# Patient Record
Sex: FEMALE | Race: WHITE | Employment: OTHER | URBAN - METROPOLITAN AREA
[De-identification: names, ages, dates, MRNs, and addresses within clinical notes are randomized per-mention and may not be internally consistent; named-entity substitution may affect disease eponyms.]

---

## 2017-06-08 ENCOUNTER — HOSPITAL ENCOUNTER (OUTPATIENT)
Dept: SURGERY | Age: 82
Discharge: HOME | End: 2017-06-08
Payer: MEDICARE

## 2017-06-08 VITALS
WEIGHT: 120.13 LBS | BODY MASS INDEX: 23.58 KG/M2 | HEIGHT: 60 IN | SYSTOLIC BLOOD PRESSURE: 145 MMHG | OXYGEN SATURATION: 99 % | TEMPERATURE: 97.8 F | DIASTOLIC BLOOD PRESSURE: 70 MMHG | RESPIRATION RATE: 16 BRPM | HEART RATE: 58 BPM

## 2017-06-08 LAB
HGB BLD-MCNC: 14.1 G/DL (ref 11.7–15.4)
POTASSIUM SERPL-SCNC: 3.7 MMOL/L (ref 3.5–5.1)

## 2017-06-08 PROCEDURE — 84132 ASSAY OF SERUM POTASSIUM: CPT | Performed by: ANESTHESIOLOGY

## 2017-06-08 PROCEDURE — 85018 HEMOGLOBIN: CPT | Performed by: ANESTHESIOLOGY

## 2017-06-08 RX ORDER — GABAPENTIN 300 MG/1
300 CAPSULE ORAL 3 TIMES DAILY
COMMUNITY

## 2017-06-08 RX ORDER — ATENOLOL 50 MG/1
50 TABLET ORAL DAILY
COMMUNITY

## 2017-06-08 RX ORDER — DILTIAZEM HYDROCHLORIDE 60 MG/1
60 TABLET, FILM COATED ORAL DAILY
COMMUNITY

## 2017-06-08 RX ORDER — RABEPRAZOLE SODIUM 20 MG/1
20 TABLET, DELAYED RELEASE ORAL
COMMUNITY

## 2017-06-08 RX ORDER — TRIAMTERENE AND HYDROCHLOROTHIAZIDE 37.5; 25 MG/1; MG/1
1 CAPSULE ORAL
COMMUNITY

## 2017-06-08 RX ORDER — SIMVASTATIN 20 MG/1
20 TABLET, FILM COATED ORAL
COMMUNITY

## 2017-06-08 NOTE — PERIOP NOTES
Patient verified name, , and surgery as listed in [facility]. TYPE  CASE:lB  Orders per surgeon: were Received  Labs per surgeon:none:  Labs per anesthesia protocol: potassium and hgb collected and sent to lab, results are in [facility]  EKG  :  EKG is not required per protocol, patient denies any chest pain, or shortness of breath on exertion      Patient provided with handouts including guide to surgery , transfusions, pain management and hand hygiene for the family and community. Pt verbalizes understanding of all pre-op instructions . Instructed that family must be present in building at all times. Nothing to eat or drink after midnight the night prior to surgery. Hibiclens and instructions given per hospital policy. Instructed patient to continue  previous medications as prescribed prior to surgery and  to take the following medications the day of surgery according to anesthesia guidelines : atenolol, cardizem, and gabapentin       Original medication prescription bottles were not visualized during patient appointment. Continue all previous medications unless otherwise directed. Instructed patient to hold  the following medications prior to surgery: all vitamins,supplements 7 days prior to surgery and all nsaids including ibuprofen,advil aleve 5 days prior to surgery      Patient verbalized understanding of all instructions and provided all medical/health information to the best of their ability.

## 2017-06-14 ENCOUNTER — ANESTHESIA EVENT (OUTPATIENT)
Dept: SURGERY | Age: 82
End: 2017-06-14
Payer: MEDICARE

## 2017-06-15 ENCOUNTER — APPOINTMENT (OUTPATIENT)
Dept: GENERAL RADIOLOGY | Age: 82
End: 2017-06-15
Attending: ORTHOPAEDIC SURGERY
Payer: MEDICARE

## 2017-06-15 ENCOUNTER — HOSPITAL ENCOUNTER (OUTPATIENT)
Age: 82
Setting detail: OUTPATIENT SURGERY
Discharge: HOME | End: 2017-06-15
Attending: ORTHOPAEDIC SURGERY | Admitting: ORTHOPAEDIC SURGERY
Payer: MEDICARE

## 2017-06-15 ENCOUNTER — ANESTHESIA (OUTPATIENT)
Dept: SURGERY | Age: 82
End: 2017-06-15
Payer: MEDICARE

## 2017-06-15 VITALS
DIASTOLIC BLOOD PRESSURE: 63 MMHG | HEART RATE: 78 BPM | TEMPERATURE: 97.6 F | HEIGHT: 60 IN | BODY MASS INDEX: 22.97 KG/M2 | SYSTOLIC BLOOD PRESSURE: 122 MMHG | WEIGHT: 117 LBS | OXYGEN SATURATION: 97 % | RESPIRATION RATE: 15 BRPM

## 2017-06-15 PROCEDURE — 74011250636 HC RX REV CODE- 250/636

## 2017-06-15 PROCEDURE — 77030013789 HC KT REP BIO TEND ARTH -C: Performed by: ORTHOPAEDIC SURGERY

## 2017-06-15 PROCEDURE — 74011000250 HC RX REV CODE- 250

## 2017-06-15 PROCEDURE — 74011250636 HC RX REV CODE- 250/636: Performed by: ANESTHESIOLOGY

## 2017-06-15 PROCEDURE — 76942 ECHO GUIDE FOR BIOPSY: CPT | Performed by: ORTHOPAEDIC SURGERY

## 2017-06-15 PROCEDURE — 77030002982 HC SUT POLYSRB J&J -A: Performed by: ORTHOPAEDIC SURGERY

## 2017-06-15 PROCEDURE — 77030020143 HC AIRWY LARYN INTUB CGAS -A: Performed by: ANESTHESIOLOGY

## 2017-06-15 PROCEDURE — 76010010054 HC POST OP PAIN BLOCK: Performed by: ORTHOPAEDIC SURGERY

## 2017-06-15 PROCEDURE — 76210000021 HC REC RM PH II 0.5 TO 1 HR: Performed by: ORTHOPAEDIC SURGERY

## 2017-06-15 PROCEDURE — 77030020753 HC CUF TRNQT 1BLA STRY -B: Performed by: ORTHOPAEDIC SURGERY

## 2017-06-15 PROCEDURE — 74011250636 HC RX REV CODE- 250/636: Performed by: ORTHOPAEDIC SURGERY

## 2017-06-15 PROCEDURE — 74011250637 HC RX REV CODE- 250/637: Performed by: ANESTHESIOLOGY

## 2017-06-15 PROCEDURE — 77030002916 HC SUT ETHLN J&J -A: Performed by: ORTHOPAEDIC SURGERY

## 2017-06-15 PROCEDURE — 77030020274 HC MISC IMPL ORTHOPEDIC: Performed by: ORTHOPAEDIC SURGERY

## 2017-06-15 PROCEDURE — 77030011640 HC PAD GRND REM COVD -A: Performed by: ORTHOPAEDIC SURGERY

## 2017-06-15 PROCEDURE — 76010000161 HC OR TIME 1 TO 1.5 HR INTENSV-TIER 1: Performed by: ORTHOPAEDIC SURGERY

## 2017-06-15 PROCEDURE — 77030003602 HC NDL NRV BLK BBMI -B: Performed by: ANESTHESIOLOGY

## 2017-06-15 PROCEDURE — C1713 ANCHOR/SCREW BN/BN,TIS/BN: HCPCS | Performed by: ORTHOPAEDIC SURGERY

## 2017-06-15 PROCEDURE — 77030006788 HC BLD SAW OSC STRY -B: Performed by: ORTHOPAEDIC SURGERY

## 2017-06-15 PROCEDURE — 76210000006 HC OR PH I REC 0.5 TO 1 HR: Performed by: ORTHOPAEDIC SURGERY

## 2017-06-15 PROCEDURE — 77030018836 HC SOL IRR NACL ICUM -A: Performed by: ORTHOPAEDIC SURGERY

## 2017-06-15 PROCEDURE — 76060000033 HC ANESTHESIA 1 TO 1.5 HR: Performed by: ORTHOPAEDIC SURGERY

## 2017-06-15 PROCEDURE — 77030025281 HC SPLNT ORTHGLS 1 BSNM -B: Performed by: ORTHOPAEDIC SURGERY

## 2017-06-15 DEVICE — SCREW INTFR L10MM DIA4MM BIOCOMPOSITE BIO-TENODESIS: Type: IMPLANTABLE DEVICE | Site: FOOT | Status: FUNCTIONAL

## 2017-06-15 RX ORDER — PROPOFOL 10 MG/ML
INJECTION, EMULSION INTRAVENOUS AS NEEDED
Status: DISCONTINUED | OUTPATIENT
Start: 2017-06-15 | End: 2017-06-15 | Stop reason: HOSPADM

## 2017-06-15 RX ORDER — CEFAZOLIN SODIUM IN 0.9 % NACL 2 G/50 ML
2 INTRAVENOUS SOLUTION, PIGGYBACK (ML) INTRAVENOUS ONCE
Status: COMPLETED | OUTPATIENT
Start: 2017-06-15 | End: 2017-06-15

## 2017-06-15 RX ORDER — LIDOCAINE HYDROCHLORIDE 10 MG/ML
0.1 INJECTION INFILTRATION; PERINEURAL AS NEEDED
Status: DISCONTINUED | OUTPATIENT
Start: 2017-06-15 | End: 2017-06-15 | Stop reason: HOSPADM

## 2017-06-15 RX ORDER — ONDANSETRON 2 MG/ML
INJECTION INTRAMUSCULAR; INTRAVENOUS AS NEEDED
Status: DISCONTINUED | OUTPATIENT
Start: 2017-06-15 | End: 2017-06-15 | Stop reason: HOSPADM

## 2017-06-15 RX ORDER — SODIUM CHLORIDE 0.9 % (FLUSH) 0.9 %
5-10 SYRINGE (ML) INJECTION AS NEEDED
Status: DISCONTINUED | OUTPATIENT
Start: 2017-06-15 | End: 2017-06-15 | Stop reason: HOSPADM

## 2017-06-15 RX ORDER — MIDAZOLAM HYDROCHLORIDE 1 MG/ML
2 INJECTION, SOLUTION INTRAMUSCULAR; INTRAVENOUS
Status: DISCONTINUED | OUTPATIENT
Start: 2017-06-15 | End: 2017-06-15 | Stop reason: HOSPADM

## 2017-06-15 RX ORDER — FENTANYL CITRATE 50 UG/ML
100 INJECTION, SOLUTION INTRAMUSCULAR; INTRAVENOUS ONCE
Status: COMPLETED | OUTPATIENT
Start: 2017-06-15 | End: 2017-06-15

## 2017-06-15 RX ORDER — SODIUM CHLORIDE 9 MG/ML
50 INJECTION, SOLUTION INTRAVENOUS CONTINUOUS
Status: DISCONTINUED | OUTPATIENT
Start: 2017-06-15 | End: 2017-06-15 | Stop reason: HOSPADM

## 2017-06-15 RX ORDER — SODIUM CHLORIDE, SODIUM LACTATE, POTASSIUM CHLORIDE, CALCIUM CHLORIDE 600; 310; 30; 20 MG/100ML; MG/100ML; MG/100ML; MG/100ML
150 INJECTION, SOLUTION INTRAVENOUS CONTINUOUS
Status: DISCONTINUED | OUTPATIENT
Start: 2017-06-15 | End: 2017-06-15 | Stop reason: HOSPADM

## 2017-06-15 RX ORDER — GLYCOPYRROLATE 0.2 MG/ML
INJECTION INTRAMUSCULAR; INTRAVENOUS AS NEEDED
Status: DISCONTINUED | OUTPATIENT
Start: 2017-06-15 | End: 2017-06-15 | Stop reason: HOSPADM

## 2017-06-15 RX ORDER — FAMOTIDINE 20 MG/1
20 TABLET, FILM COATED ORAL ONCE
Status: COMPLETED | OUTPATIENT
Start: 2017-06-15 | End: 2017-06-15

## 2017-06-15 RX ORDER — HYDROCODONE BITARTRATE AND ACETAMINOPHEN 5; 325 MG/1; MG/1
1 TABLET ORAL AS NEEDED
Status: DISCONTINUED | OUTPATIENT
Start: 2017-06-15 | End: 2017-06-15 | Stop reason: HOSPADM

## 2017-06-15 RX ORDER — SODIUM CHLORIDE 0.9 % (FLUSH) 0.9 %
5-10 SYRINGE (ML) INJECTION EVERY 8 HOURS
Status: DISCONTINUED | OUTPATIENT
Start: 2017-06-15 | End: 2017-06-15 | Stop reason: HOSPADM

## 2017-06-15 RX ORDER — HYDROMORPHONE HYDROCHLORIDE 2 MG/ML
0.5 INJECTION, SOLUTION INTRAMUSCULAR; INTRAVENOUS; SUBCUTANEOUS
Status: DISCONTINUED | OUTPATIENT
Start: 2017-06-15 | End: 2017-06-15 | Stop reason: HOSPADM

## 2017-06-15 RX ORDER — LIDOCAINE HYDROCHLORIDE 20 MG/ML
INJECTION, SOLUTION EPIDURAL; INFILTRATION; INTRACAUDAL; PERINEURAL AS NEEDED
Status: DISCONTINUED | OUTPATIENT
Start: 2017-06-15 | End: 2017-06-15 | Stop reason: HOSPADM

## 2017-06-15 RX ORDER — ACETAMINOPHEN 500 MG
1000 TABLET ORAL
Status: DISCONTINUED | OUTPATIENT
Start: 2017-06-15 | End: 2017-06-15 | Stop reason: HOSPADM

## 2017-06-15 RX ADMIN — LIDOCAINE HYDROCHLORIDE 60 MG: 20 INJECTION, SOLUTION EPIDURAL; INFILTRATION; INTRACAUDAL; PERINEURAL at 10:58

## 2017-06-15 RX ADMIN — GLYCOPYRROLATE 0.2 MG: 0.2 INJECTION INTRAMUSCULAR; INTRAVENOUS at 11:21

## 2017-06-15 RX ADMIN — SODIUM CHLORIDE, SODIUM LACTATE, POTASSIUM CHLORIDE, AND CALCIUM CHLORIDE 150 ML/HR: 600; 310; 30; 20 INJECTION, SOLUTION INTRAVENOUS at 08:10

## 2017-06-15 RX ADMIN — ONDANSETRON 4 MG: 2 INJECTION INTRAMUSCULAR; INTRAVENOUS at 11:10

## 2017-06-15 RX ADMIN — GLYCOPYRROLATE 0.1 MG: 0.2 INJECTION INTRAMUSCULAR; INTRAVENOUS at 11:26

## 2017-06-15 RX ADMIN — FENTANYL CITRATE 100 MCG: 50 INJECTION, SOLUTION INTRAMUSCULAR; INTRAVENOUS at 10:05

## 2017-06-15 RX ADMIN — GLYCOPYRROLATE 0.1 MG: 0.2 INJECTION INTRAMUSCULAR; INTRAVENOUS at 11:52

## 2017-06-15 RX ADMIN — CEFAZOLIN 2 G: 1 INJECTION, POWDER, FOR SOLUTION INTRAMUSCULAR; INTRAVENOUS; PARENTERAL at 10:57

## 2017-06-15 RX ADMIN — SODIUM CHLORIDE, SODIUM LACTATE, POTASSIUM CHLORIDE, AND CALCIUM CHLORIDE: 600; 310; 30; 20 INJECTION, SOLUTION INTRAVENOUS at 11:54

## 2017-06-15 RX ADMIN — MIDAZOLAM HYDROCHLORIDE 1 MG: 1 INJECTION, SOLUTION INTRAMUSCULAR; INTRAVENOUS at 10:05

## 2017-06-15 RX ADMIN — FAMOTIDINE 20 MG: 20 TABLET, FILM COATED ORAL at 08:15

## 2017-06-15 RX ADMIN — PROPOFOL 130 MG: 10 INJECTION, EMULSION INTRAVENOUS at 10:58

## 2017-06-15 NOTE — IP AVS SNAPSHOT
[facility] 
 
 
 [address] 
[phone] Patient: Annie Rivera MRN: [id number] MHD:[id number] You are allergic to the following No active allergies Recent Documentation Height Weight BMI OB Status Smoking Status 1.524 m 53.1 kg 22.85 kg/m2 Hysterectomy Never Smoker Emergency Contacts Name Discharge Info Relation Home Work Mobile [address] [facility] CAREGIVER [3] Spouse [3] [phone] About your hospitalization You were admitted on:  Dania 15, 2017 You last received care in the:  [facility] OP PACU You were discharged on:  Dania 15, 2017 Unit phone number:  [phone] Why you were hospitalized Your primary diagnosis was:  Not on File Providers Seen During Your Hospitalizations Provider Role Specialty Primary office phone Krystle Carter MD Attending Provider Orthopedic Surgery [phone] Your Primary Care Physician (PCP) Primary Care Physician Office Phone Office Fax Veronica Vogel [phone] Follow-up Information Follow up With Details Comments Contact Info Krystle Carter MD  Keep follow up appointment as scheduled [place] [address] 
[phone] Current Discharge Medication List  
  
ASK your doctor about these medications Dose & Instructions Dispensing Information Comments Morning Noon Evening Bedtime ACIPHEX 20 mg tablet Generic drug:  RABEprazole Your last dose was: Your next dose is:    
   
   
 Dose:  20 mg Take 20 mg by mouth daily (with lunch). Indications: gastroesophageal reflux disease Refills:  0  
     
   
   
   
  
 atenolol 50 mg tablet Commonly known as:  TENORMIN Your last dose was:     
   
Your next dose is:    
   
   
 Dose:  50 mg  
 Take 50 mg by mouth daily. Refills:  0 CARDIZEM 60 mg tablet Generic drug:  dilTIAZem Your last dose was: Your next dose is:    
   
   
 Dose:  60 mg Take 60 mg by mouth daily. Refills:  0  
     
   
   
   
  
 gabapentin 300 mg capsule Commonly known as:  NEURONTIN Your last dose was: Your next dose is:    
   
   
 Dose:  300 mg Take 300 mg by mouth three (3) times daily. Indications: NEUROPATHIC PAIN Refills:  0  
     
   
   
   
  
 simvastatin 20 mg tablet Commonly known as:  ZOCOR Your last dose was: Your next dose is:    
   
   
 Dose:  20 mg Take 20 mg by mouth nightly. Indications: hypercholesterolemia Refills:  0  
     
   
   
   
  
 triamterene-hydroCHLOROthiazide 37.5-25 mg per capsule Commonly known as:  Jose David Yumi Your last dose was: Your next dose is:    
   
   
 Dose:  1 Cap Take 1 Cap by mouth daily (with lunch). Refills:  0 Discharge Instructions INSTRUCTIONS FOLLOWING FOOT SURGERY 
 
ACTIVITY Elevate foot. No Ice No weight bearing. Use crutches or knee walker until seen in follow up appointment Don't put anything into the splint to relieve itching etc. Take one 325mg aspirin daily if okay with your medical doctor and you have no GI ulcer. Get up and out of bed frequently. While in bed move the legs as much as possible) DIET Day of Surgery: Clear liquids until no nausea or vomiting; then light, bland diet (Baked chicken, plain rice, grits, scrambled egg, toast). Nothing Greasy, fried or spicy today Advance to regular diet on second day, unless your doctor orders otherwise. PAIN Take pain medications as directed by your doctor. Call your doctor if pain is NOT relieved by medication. PAIN MED SIDE EFFECTS Constipation: Lots of fluids, try prune juice, then OTC stool softeners if necessary Nausea: Take medication with food. DRESSING CARE Keep dry and in place until follow up appointment CALL YOUR DOCTOR IF YOU HAVE Excessive bleeding that does not stop after holding mild pressure over the area. Temperature of 101 degrees or above. Redness, excessive swelling or bruising, and/or green or yellow, smelly discharge from incision. Loss of sensation - cold, white or blue toes. AFTER ANESTHESIA For the first 24 hours and while taking narcotics for pain: DO NOT Drive, Drink Alcoholic beverages, or make important Decisions. Be aware of dizziness following anesthesia and while taking pain medication. Preventing Infection at Home We care about preventing infection and avoiding the spread of germs  not only when you are in the hospital but also when you return home. When you return home from the hospital, its important to take the following steps to help prevent infection and avoid spreading germs that could infect you and others. Ask everyone in your home to follow these guidelines, too. Clean Your Hands · Clean your hands whenever your hands are visibly dirty, before you eat, before or after touching your mouth, nose or eyes, and before preparing food. Clean them after contact with body fluids, using the restroom, touching animals or changing diapers. · When washing hands, wet them with warm water and work up a lather. Rub hands for at least 15 seconds, then rinse them and pat them dry with a clean towel or paper towel. · When using hand sanitizers, it should take about 15 seconds to rub your hands dry. If not, you probably didnt apply enough . Cover Your Sneeze or Cough Germs are released into the air whenever you sneeze or cough. To prevent the spread of infection: · Turn away from other people before coughing or sneezing. · Cover your mouth or nose with a tissue when you cough or sneeze. Put the tissue in the trash. · If you dont have a tissue, cough or sneeze into your upper sleeve, not your hands. · Always clean your hands after coughing or sneezing. Care for Wounds Your skin is your bodys first line of defense against germs, but an open wound leaves an easy way for germs to enter your body. To prevent infection: · Clean your hands before and after changing wound dressings, and wear gloves to change dressings if recommended by your doctor. · Take special care with IV lines or other devices inserted into the body. If you must touch them, clean your hands first. 
· Follow any specific instructions from your doctor to care for your wounds. Contact your doctor if you experience any signs of infection, such as fever or increased redness at the surgical or wound site. Keep a [address] · Clean or wipe commonly touched hard surfaces like door handles, sinks, tabletops, phones and TV remotes. · Use products labeled disinfectant to kill harmful bacteria and viruses. · Use a clean cloth or paper towel to clean and dry surfaces. Wiping surfaces with a dirty dishcloth, sponge or towel will only spread germs. · Never share toothbrushes, trujillo, drinking glasses, utensils, razor blades, face cloths or bath towels to avoid spreading germs. · Be sure that the linens that you sleep on are clean. · Keep pets away from wounds and wash your hands after touching pets, their toys or bedding. We care about you and your health. Remember, preventing infections is a team effort between you, your family, friends and health care providers. APPOINTMENT DATE/TIME Keep as scheduled YOUR DOCTOR'S PHONE NUMBER [phone] DISCHARGE SUMMARY from Nurse PATIENT INSTRUCTIONS: 
 
After general anesthesia or intravenous sedation, for 24 hours or while taking prescription Narcotics: · Limit your activities · Do not drive and operate hazardous machinery · Do not make important personal or business decisions · Do  not drink alcoholic beverages · If you have not urinated within 8 hours after discharge, please contact your surgeon on call. *  Please give a list of your current medications to your Primary Care Provider. *  Please update this list whenever your medications are discontinued, doses are 
    changed, or new medications (including over-the-counter products) are added. *  Please carry medication information at all times in case of emergency situations. These are general instructions for a healthy lifestyle: No smoking/ No tobacco products/ Avoid exposure to second hand smoke Surgeon General's Warning:  Quitting smoking now greatly reduces serious risk to your health. Obesity, smoking, and sedentary lifestyle greatly increases your risk for illness A healthy diet, regular physical exercise & weight monitoring are important for maintaining a healthy lifestyle You may be retaining fluid if you have a history of heart failure or if you experience any of the following symptoms:  Weight gain of 3 pounds or more overnight or 5 pounds in a week, increased swelling in our hands or feet or shortness of breath while lying flat in bed. Please call your doctor as soon as you notice any of these symptoms; do not wait until your next office visit. Recognize signs and symptoms of STROKE: 
 
F-face looks uneven A-arms unable to move or move unevenly S-speech slurred or non-existent T-time-call 911 as soon as signs and symptoms begin-DO NOT go Back to bed or wait to see if you get better-TIME IS BRAIN. Discharge Orders None ACO Transitions of Care Introducing [employer] [address] offers a voluntary care coordination program to provide high quality service and care to [facility] fee-for-service beneficiaries. Lupe Hemphill was designed to help you enhance your health and well-being through the following services: ? Transitions of Care  support for individuals who are transitioning from one care setting to another (example: Hospital to home). ? Chronic and Complex Care Coordination  support for individuals and caregivers of those with serious or chronic illnesses or with more than one chronic (ongoing) condition and those who take a number of different medications. If you meet specific medical criteria, a [facility] [address] may call you directly to coordinate your care with your primary care physician and your other care providers. For questions about the [facility] programs, please, contact your physicians office. For general questions or additional information about Accountable Care Organizations: 
Please visit www.medicare.gov/acos. html or call 1-800-MEDICARE (5-701.805.4366) TTY users should call 9-428.792.2570. Introducing [facility] SERVICES! Daniel Michaels introduces [employer] patient portal. Now you can access parts of your medical record, email your doctor's office, and request medication refills online. 1. In your internet browser, go to [email or web address] 2. Click on the First Time User? Click Here link in the Sign In box. You will see the New Member Sign Up page. 3. Enter your [employer] Access Code exactly as it appears below. You will not need to use this code after youve completed the sign-up process. If you do not sign up before the expiration date, you must request a new code. · [employer] Access Code: [email or web address] Expires: 9/3/2017  8:09 AM 
 
4. Enter the last four digits of your Social Security Number (xxxx) and Date of Birth (mm/dd/yyyy) as indicated and click Submit. You will be taken to the next sign-up page. 5. Create a [employer] ID. This will be your [employer] login ID and cannot be changed, so think of one that is secure and easy to remember. 6. Create a [employer] password. You can change your password at any time. 7. Enter your Password Reset Question and Answer. This can be used at a later time if you forget your password. 8. Enter your e-mail address. You will receive e-mail notification when new information is available in [address] 9. Click Sign Up. You can now view and download portions of your medical record. 10. Click the Download Summary menu link to download a portable copy of your medical information. If you have questions, please visit the Frequently Asked Questions section of the [employer] website. Remember, [employer] is NOT to be used for urgent needs. For medical emergencies, dial 911. Now available from your iPhone and Android! General Information Please provide this summary of care documentation to your next provider. Patient Signature:  ____________________________________________________________ Date:  ____________________________________________________________  
  
Bijan Fredugh Provider Signature:  ____________________________________________________________ Date:  ____________________________________________________________

## 2017-06-15 NOTE — ANESTHESIA PROCEDURE NOTES
Peripheral Block    Start time: 6/15/2017 10:05 AM  End time: 6/15/2017 10:12 AM  Performed by: Thanh Welch  Authorized by: Thanh Welch       Pre-procedure: Indications: at surgeon's request and post-op pain management    Preanesthetic Checklist: patient identified, risks and benefits discussed, site marked, timeout performed, anesthesia consent given and patient being monitored    Timeout Time: 10:05          Block Type:   Block Type:  Popliteal  Laterality:  Left  Monitoring:  Standard ASA monitoring, responsive to questions, oxygen, continuous pulse ox, frequent vital sign checks and heart rate  Injection Technique:  Single shot  Procedures: ultrasound guided and nerve stimulator    Patient Position: right lateral decubitus (lateral decubitus)  Prep: chlorhexidine    Location:  Mid thigh  Needle Type:  Stimuplex  Needle Gauge:  22 G  Needle Localization:  Nerve stimulator and ultrasound guidance  Motor Response: minimal motor response >0.4 mA    Medication Injected:  0.375%  ropivacaine  Adds:  Epi 1:200K  Volume (mL):  30  Add'l Medication Injected:  1.5%  mepivacaine  Volume (mL):  7    Assessment:  Number of attempts:  1  Injection Assessment:  Incremental injection every 5 mL, local visualized surrounding nerve on ultrasound, negative aspiration for blood, no intravascular symptoms, no paresthesia and ultrasound image on chart  Patient tolerance:  Patient tolerated the procedure well with no immediate complications  All needles out intact, proc. Tolerated well.

## 2017-06-15 NOTE — ANESTHESIA PREPROCEDURE EVALUATION
Anesthetic History   No history of anesthetic complications            Review of Systems / Medical History  Patient summary reviewed, nursing notes reviewed and pertinent labs reviewed    Pulmonary  Within defined limits                 Neuro/Psych   Within defined limits           Cardiovascular    Hypertension: well controlled              Exercise tolerance: >4 METS     GI/Hepatic/Renal     GERD: well controlled           Endo/Other        Arthritis     Other Findings              Physical Exam    Airway  Mallampati: II  TM Distance: 4 - 6 cm  Neck ROM: normal range of motion   Mouth opening: Diminished (comment)     Cardiovascular               Dental    Dentition: Full lower dentures and Full upper dentures     Pulmonary                 Abdominal         Other Findings            Anesthetic Plan    ASA: 2  Anesthesia type: general      Post-op pain plan if not by surgeon: peripheral nerve block single    Induction: Intravenous  Anesthetic plan and risks discussed with: Patient and Spouse

## 2017-06-15 NOTE — ANESTHESIA POSTPROCEDURE EVALUATION
Post-Anesthesia Evaluation and Assessment    Patient: Evelin Gilbert MRN: [id number]  SSN: [id number]    YOB: 1935  Age: 80 y.o. Sex: female       Cardiovascular Function/Vital Signs  Visit Vitals    /63 (BP 1 Location: Right arm, BP Patient Position: Supine)    Pulse 78    Temp 36.4 °C (97.6 °F)    Resp 15    Ht 5' (1.524 m)    Wt 53.1 kg (117 lb)    SpO2 97%    BMI 22.85 kg/m2       Patient is status post general anesthesia for Procedure(s):  LEFT SPRING  LIGAMENT RECONSTRUCTION WITH PTT RECON /FDL TENDON TRANSFER MEDIAL SLIDE CALCANEAL OSTEOTOMY /  COTTON OSTEOTOMY ACHILLES LENGTHENING. Nausea/Vomiting: None    Postoperative hydration reviewed and adequate. Pain:  Pain Scale 1: Numeric (0 - 10) (06/15/17 1259)  Pain Intensity 1: 0 (06/15/17 1259)   Managed    Neurological Status:   Neuro (WDL): Exceptions to WDL (06/15/17 1259)  Neuro  Neurologic State: Drowsy (06/15/17 1211)  LUE Motor Response: Purposeful (06/15/17 1211)  LLE Motor Response: Pharmacologically paralyzed (06/15/17 1259)  RUE Motor Response: Purposeful (06/15/17 1211)  RLE Motor Response: Purposeful (06/15/17 1211)   At baseline    Mental Status and Level of Consciousness: Arousable    Pulmonary Status:   O2 Device: Room air (06/15/17 1259)   Adequate oxygenation and airway patent    Complications related to anesthesia: None    Post-anesthesia assessment completed. No concerns. Good result with popliteal and adductor canal blocks.     Signed By: Kay Espinoza MD     Dania 15, 2017

## 2017-06-15 NOTE — ANESTHESIA PROCEDURE NOTES
Peripheral Block    Start time: 6/15/2017 10:12 AM  End time: 6/15/2017 10:17 AM  Performed by: Almaz Hartley  Authorized by: Almaz Hartley       Pre-procedure: Indications: at surgeon's request and post-op pain management    Preanesthetic Checklist: patient identified, risks and benefits discussed, site marked, timeout performed, anesthesia consent given and patient being monitored    Timeout Time: 10:12          Block Type:   Block Type: Adductor canal  Laterality:  Left  Monitoring:  Standard ASA monitoring, responsive to questions, oxygen, continuous pulse ox, frequent vital sign checks and heart rate  Injection Technique:  Single shot  Procedures: ultrasound guided    Patient Position: supine  Prep: chlorhexidine    Location:  Mid thigh  Needle Type:  Stimuplex  Needle Gauge:  22 G  Needle Localization:  Ultrasound guidance  Medication Injected:  0.5%  ropivacaine  Adds:  Epi 1:200K  Volume (mL):  20    Assessment:  Number of attempts:  1  Injection Assessment:  Incremental injection every 5 mL, local visualized surrounding nerve on ultrasound, negative aspiration for blood, no intravascular symptoms, no paresthesia and ultrasound image on chart  Patient tolerance:  Patient tolerated the procedure well with no immediate complications  All needles out intact, proc. Tolerated well.

## 2017-06-15 NOTE — DISCHARGE INSTRUCTIONS
INSTRUCTIONS FOLLOWING FOOT SURGERY    ACTIVITY  Elevate foot. No Ice  No weight bearing. Use crutches or knee walker until seen in follow up appointment  Don't put anything into the splint to relieve itching etc. Take one 325mg aspirin daily if okay with your medical doctor and you have no GI ulcer. Get up and out of bed frequently. While in bed move the legs as much as possible)    DIET  Day of Surgery: Clear liquids until no nausea or vomiting; then light, bland diet (Baked chicken, plain rice, grits, scrambled egg, toast). Nothing Greasy, fried or spicy today  Advance to regular diet on second day, unless your doctor orders otherwise. PAIN  Take pain medications as directed by your doctor. Call your doctor if pain is NOT relieved by medication. PAIN MED SIDE EFFECTS  Constipation: Lots of fluids, try prune juice, then OTC stool softeners if necessary  Nausea: Take medication with food. DRESSING CARE Keep dry and in place until follow up appointment    CALL YOUR DOCTOR IF YOU HAVE  Excessive bleeding that does not stop after holding mild pressure over the area. Temperature of 101 degrees or above. Redness, excessive swelling or bruising, and/or green or yellow, smelly discharge from incision. Loss of sensation - cold, white or blue toes. AFTER ANESTHESIA  For the first 24 hours and while taking narcotics for pain: DO NOT Drive, Drink Alcoholic beverages, or make important Decisions. Be aware of dizziness following anesthesia and while taking pain medication. Preventing Infection at Home  We care about preventing infection and avoiding the spread of germs - not only when you are in the hospital but also when you return home. When you return home from the hospital, its important to take the following steps to help prevent infection and avoid spreading germs that could infect you and others. Ask everyone in your home to follow these guidelines, too.     Clean Your Hands  · Clean your hands whenever your hands are visibly dirty, before you eat, before or after touching your mouth, nose or eyes, and before preparing food. Clean them after contact with body fluids, using the restroom, touching animals or changing diapers. · When washing hands, wet them with warm water and work up a lather. Rub hands for at least 15 seconds, then rinse them and pat them dry with a clean towel or paper towel. · When using hand sanitizers, it should take about 15 seconds to rub your hands dry. If not, you probably didnt apply enough . Cover Your Sneeze or Cough  Germs are released into the air whenever you sneeze or cough. To prevent the spread of infection:  · Turn away from other people before coughing or sneezing. · Cover your mouth or nose with a tissue when you cough or sneeze. Put the tissue in the trash. · If you dont have a tissue, cough or sneeze into your upper sleeve, not your hands. · Always clean your hands after coughing or sneezing. Care for Wounds  Your skin is your bodys first line of defense against germs, but an open wound leaves an easy way for germs to enter your body. To prevent infection:  · Clean your hands before and after changing wound dressings, and wear gloves to change dressings if recommended by your doctor. · Take special care with IV lines or other devices inserted into the body. If you must touch them, clean your hands first.  · Follow any specific instructions from your doctor to care for your wounds. Contact your doctor if you experience any signs of infection, such as fever or increased redness at the surgical or wound site. Keep a Clean Home  · Clean or wipe commonly touched hard surfaces like door handles, sinks, tabletops, phones and TV remotes. · Use products labeled disinfectant to kill harmful bacteria and viruses. · Use a clean cloth or paper towel to clean and dry surfaces.  Wiping surfaces with a dirty dishcloth, sponge or towel will only spread germs.  · Never share toothbrushes, trujillo, drinking glasses, utensils, razor blades, face cloths or bath towels to avoid spreading germs. · Be sure that the linens that you sleep on are clean. · Keep pets away from wounds and wash your hands after touching pets, their toys or bedding. We care about you and your health. Remember, preventing infections is a team effort between you, your family, friends and health care providers. APPOINTMENT DATE/TIME Keep as scheduled    YOUR DOCTOR'S PHONE NUMBER [phone]      DISCHARGE SUMMARY from Nurse    PATIENT INSTRUCTIONS:    After general anesthesia or intravenous sedation, for 24 hours or while taking prescription Narcotics:  · Limit your activities  · Do not drive and operate hazardous machinery  · Do not make important personal or business decisions  · Do  not drink alcoholic beverages  · If you have not urinated within 8 hours after discharge, please contact your surgeon on call. *  Please give a list of your current medications to your Primary Care Provider. *  Please update this list whenever your medications are discontinued, doses are      changed, or new medications (including over-the-counter products) are added. *  Please carry medication information at all times in case of emergency situations. These are general instructions for a healthy lifestyle:    No smoking/ No tobacco products/ Avoid exposure to second hand smoke    Surgeon General's Warning:  Quitting smoking now greatly reduces serious risk to your health.     Obesity, smoking, and sedentary lifestyle greatly increases your risk for illness    A healthy diet, regular physical exercise & weight monitoring are important for maintaining a healthy lifestyle    You may be retaining fluid if you have a history of heart failure or if you experience any of the following symptoms:  Weight gain of 3 pounds or more overnight or 5 pounds in a week, increased swelling in our hands or feet or shortness of breath while lying flat in bed. Please call your doctor as soon as you notice any of these symptoms; do not wait until your next office visit. Recognize signs and symptoms of STROKE:    F-face looks uneven    A-arms unable to move or move unevenly    S-speech slurred or non-existent    T-time-call 911 as soon as signs and symptoms begin-DO NOT go       Back to bed or wait to see if you get better-TIME IS BRAIN.

## 2017-06-15 NOTE — PERIOP NOTES
Discharge instructions given to spouse. Spouse states pt has crutches at home and has experience with their use. Verbalized understanding and opportunity for questions was given. Dr Singh Channel okay to discharge at this time. Pt and belongings taken via wheelchair to car.

## 2017-06-15 NOTE — BRIEF OP NOTE
BRIEF OPERATIVE NOTE    Date of Procedure: 6/15/2017   Preoperative Diagnosis: Posterior tibial tendinitis, left leg [M76.822]  Postoperative Diagnosis: Posterior tibial tendinitis, left leg     Procedure(s):  LEFT SPRING  LIGAMENT RECONSTRUCTION WITH PTT RECON /FDL TENDON TRANSFER MEDIAL SLIDE CALCANEAL OSTEOTOMY /  COTTON OSTEOTOMY ACHILLES LENGTHENING  Surgeon(s) and Role:     * Sonya Harris MD - Primary         Assistant Staff:       Surgical Staff:  Circ-1: Maxx Guidry RN  Circ-Relief: Billie Dinh. Georgette Singh RN  Radiology Technician: Anup Domínguez, RT, R, CT  Scrub Tech-1: [employer]  Scrub Tech-Relief: [employer]  Event Time In   Incision Start 1111   Incision Close 1200     Anesthesia: General   Estimated Blood Loss: min  Specimens: * No specimens in log *   Findings: no  Complications: no  Implants:   Implant Name Type Inv.  Item Serial No.  Lot No. LRB No. Used Action   [id number] Left 1 Implanted   SCR INTFR BIOTENDSIS 4.00MM --  - [id number]  SCR INTFR BIOTENDSIS 4.00MM --   [id number] Left 1 Implanted   ANATONIC COTTON WEDGE       [employer] [id number] Left 1 Implanted

## 2017-06-16 NOTE — OP NOTES
[facility]   OPERATIVE REPORT       Name:  Mimi Arellano   MR#:  [id number]   :  1935   Account #:  [de-identified]   Date of Adm:  06/15/2017       DATE OF PROCEDURE: 06/15/2017     PREPROCEDURE DIAGNOSIS: Left posterior tibial tendon   insufficiency. POSTPROCEDURE DIAGNOSIS: Left posterior tibial tendon   insufficiency. NAME OF PROCEDURE   1. Left gastrocnemius recession. 2. Left medial displacement calcaneal osteotomy. 3. Left posterior tibial tendon debridement with flexor   digitorum longus tendon transfer. 4.left spring ligament repair. 5. Left Cotton medial cuneiform opening wedge osteotomy. SURGEON: Tarah Nguyen MD    ANESTHESIA: Popliteal block with monitored anesthesia care. ESTIMATED BLOOD LOSS: Minimal.     TOURNIQUET TIME: 50 minutes at 250 mmHg. ANTIBIOTIC PROPHYLAXIS: Ancef given prior to procedure. INDICATIONS FOR PROCEDURE: Ms. Levi Arzola is a 68-year-old white   female with symptomatic left posterior tibial tendon   insufficiency who has failed conservative therapy and desires   surgical treatment. Risks and benefits of procedure including,   but not limited to risk of anesthetic complications including   myocardial infarction, stroke, and death, as well as surgical   complications including damage to nerves and blood vessels, risk   of infection, risk of incomplete pain relief, risk of malunion,   risk of nonunion, and need for additional surgery have been   discussed at length with the patient. She understands the risks   and wishes to proceed with surgery at this time. DETAILS OF PROCEDURE: The patient's operative site was marked   with indelible ink in the preoperative holding area. A block was   placed by the Department of Anesthesia. The patient was taken to   the operating room and placed supine.  During a preop surgical   timeout, the left lower extremity was identified as the correct   surgical site and prepped and draped in a standard sterile   fashion using ChloraPrep solution. A gastrocnemius recession was performed by posterior approach,   taking care to protect the sural nerve, and was repaired using   Monocryl and nylon sutures. At that point, a lateral approach to the heel was then performed   and an osteotomy the tuberosity fragment shifted 1 cm medially and   then secured using a [employer] 6.5 mm titanium screw under   fluoroscopic guidance. This wound was then irrigated and closed   using Monocryl and nylon sutures. A medial approach to the mid foot was then performed at that   time. There was significant mucinous degeneration located at the   posterior tibial tendon, which was debrided at that time. The   FDL tendon was identified and then was harvested and brought   down a drill hole in the navicular and secured using an   interference screw. The spring ligament was repaired using   nonabsorbable sutures. A side-to-side tenodesis of the posterior   tibial tendon stump to the FDL tendon was also performed using   nonabsorbable sutures. This wound was then irrigated and closed   using Monocryl and nylon sutures. A dorsal approach to the   cuneiform was then performed at that time. A medial opening   wedge cuneiform osteotomy was performed at that time with   correction of the patient's fixed forefoot varus and an [employer]   BioSync titanium wedge was then placed without difficulty. The   wound was then irrigated and closed using Monocryl and nylon   sutures. A sterile dressing was then applied, followed by a   well-padded posterior splint. Anesthesia was discontinued. The patient was subsequently   transferred to the recovery bed, taken to the recovery room in   satisfactory condition. She appeared to tolerate the procedure   well. There were no apparent surgical or anesthetic   complications. All needle and sponge counts were correct.         MD SATHISH Gonzalez / Ana Drought   D:  06/15/2017   18:28   T: 06/16/2017   03:58   Job #:  [id number]

## 2017-10-10 ENCOUNTER — HOSPITAL ENCOUNTER (OUTPATIENT)
Dept: PHYSICAL THERAPY | Age: 82
Discharge: HOME | End: 2017-10-10
Payer: MEDICARE

## 2017-10-10 PROCEDURE — G8979 MOBILITY GOAL STATUS: HCPCS

## 2017-10-10 PROCEDURE — G8978 MOBILITY CURRENT STATUS: HCPCS

## 2017-10-10 PROCEDURE — 97162 PT EVAL MOD COMPLEX 30 MIN: CPT

## 2017-10-10 PROCEDURE — 97110 THERAPEUTIC EXERCISES: CPT

## 2017-10-10 NOTE — THERAPY EVALUATION
Lindsay Jordan  : 1935  [place] / [place] / Product Type: Medicare /  [address]  at [facility] [address]  [phone]   [phone]         OUTPATIENT PHYSICAL THERAPY:Initial Assessment 10/10/2017    ICD-10: Treatment Diagnosis: DIFFICULTY WALKING, NOT ELSEWHERE CLASSIFIED R26.2; POSTERIOR TIBIAL TENDONITIS L94.488   Precautions/Allergies: non reported  Fall Risk Score: 0 (? 5 = High Risk)  MD Orders: evaluate and treat MEDICAL/REFERRING DIAGNOSIS:Left foot pain [M79.672]  DATE OF ONSET: surgery date 6-15-17  REFERRING PHYSICIAN:Merlin Maguire MD  RETURN PHYSICIAN APPOINTMENT: did not specify      INITIAL ASSESSMENT:  Mrs. Abeba Alba presents to physical therapy with symptoms post  left gastrocnemius recession, medial displacement calcaneal osteotomy, posterior tibial tendon debridement with flexor digitorum longus tendon transfer, spring ligament repair and cotton medial cuneiform opening wedge osteotomy. The examination reveals talocrural ROM restrictions, weakness, decreased balance and antalgic gait pattern. The examination is of moderate complexity due to an evolving clinical presentation. Skilled physical therapy is recommended to progress the plan of care in order for the patient to return to full function with minimal symptoms. PROBLEM LIST (Impacting functional limitations):  1. Decreased Strength  2. Decreased ADL/Functional Activities  3. Increased Pain  4. Decreased Activity Tolerance  5. Decreased Flexibility/Joint Mobility  6. Decreased Bent with Home Exercise Program INTERVENTIONS PLANNED:  1. Cold  2. Heat  3. Home Exercise Program (HEP)  4. Manual Therapy  5. Range of Motion (ROM)  6. Therapeutic Exercise/Strengthening     TREATMENT PLAN:  Effective Dates: 10-10-17 TO 12-10-17.     Frequency/Duration: 2 times a week for 8 weeks  GOALS: (Goals have been discussed and agreed upon with patient.)  SHORT-TERM FUNCTIONAL GOALS: Time Frame: 2-4 wks  1. Patient will report 25-50% reduction in overall symptoms  2. Patient will be compliant with HEP and plan of care  3. Patient will improve talocrural PF by 10 deg   4. Patient will improve on the FAAM by 3-5 points  DISCHARGE GOALS: Time Frame: 6-8 wk  1. Patient will report % reduction in overall symptoms in order to be able to have full function with decreased symptoms  2. Patient will be able to perform a single leg heel raise indicating improved achilles and posterior tib strength   3. Patient will report being able to perform all activities of daily living with minimal pain or compensation  4. Patient will improve on the FAAM by 10-15 points in order to demonstrate improved function with less pain    Rehabilitation Potential For Stated Goals: Good    Regarding Freelisaa Hippo therapy, I certify that the treatment plan above will be carried out by a therapist or under their direction. Thank you for this referral,  Parrish Hawkins PT,DPT    Referring Physician Signature: Sierra Jara MD _____________________________________________________ Date: __________________________________          HISTORY:   History of Present Injury/Illness (Reason for Referral): reports that she fell about a yr ago and tore a ligament causing her to walk poorly. She then hurt her tendon and had surgery to fix it on 6-15.17. She has been doing exercises that were prescribed by Dr. Candice Pandya but still has issues with tightness and weakness. Feels like she can only stand about 30 minutes. She is still struggling with swelling. She has been wearing her ASO brace but feels like the pressure of it is making her worse now.  States she has tingling sensation on occasion  Aggravating factors: standing, walking  Relieving factors: rest non weight bearing    Past Medical History/Comorbidities:   Past Medical History:   Diagnosis Date    Arthritis     GERD (gastroesophageal reflux disease)     daily med    Hypertension     managed with med    Mitral valve prolapse     Gallbladder removed     Hysterectomy       Social History/Living Environment: lives in a single story house  Prior Level of Function/Work/Activity: independent   Current Medications:      Current Outpatient Prescriptions:     atenolol (TENORMIN) 50 mg tablet, Take 50 mg by mouth daily. , Disp: , Rfl:     dilTIAZem (CARDIZEM) 60 mg tablet, Take 60 mg by mouth daily. , Disp: , Rfl:     RABEprazole (ACIPHEX) 20 mg tablet, Take 20 mg by mouth daily (with lunch). Indications: gastroesophageal reflux disease, Disp: , Rfl:     gabapentin (NEURONTIN) 300 mg capsule, Take 300 mg by mouth three (3) times daily. Indications: NEUROPATHIC PAIN, Disp: , Rfl:     simvastatin (ZOCOR) 20 mg tablet, Take 20 mg by mouth nightly. Indications: hypercholesterolemia, Disp: , Rfl:     triamterene-hydroCHLOROthiazide (DYAZIDE) 37.5-25 mg per capsule, Take 1 Cap by mouth daily (with lunch). , Disp: , Rfl:   Date Last Reviewed:  10/16/2017   Number of Personal Factors/Comorbidities that affect the Plan of Care: 1-2: MODERATE COMPLEXITY   EXAMINATION:   (Abbreviations: P-pain, NP- no pain, wnl-within normal limits)  Observation/Orthostatic Postural Assessment:    Relaxed standing posture:  Bilateral femurs are in relative neutral, tibia's are externally rotated     Palpation/tone/tissue texture:    ASIS:n/a  PSIS:n/a  Leg Length: supine:n/a         Long Sitting:      Soft tissue:  · Gastroc/soleus/posterior tibialis: relatively within normal limits   · Planter fascia: mild tightness on L       Foot Exam Date:  10-10-17       Left Right   Talocrural Joint: Restricted into PF  wnl   Rear foot: (neutral to relaxed: 4-6                      deg-normal) Mild restriction wnl    Mid-foot (navicular drop, <7-normal) n/a n/a   Forefoot: (position, mobility) varus Varus, mobile    First ray position/hallux limitus test: PF, hypomobile PF, mobile   Windlass test: n/a n/a   Foot intrinsic strength:  Fair  fair       ROM:   Multisegmental ROM Date:  10-10-17       Flexion -   Extension -   Rotation L -   Rotation R -      Ankle ROM Date:  10-10-17       Right Left   DF 10 18   PF 30 17   Inversion 25 25   Eversion 20 25           Strength: Foot and ankle Strength Date:  10-10-17       Right Left   DF 4 4+   PF 4 3-   Inversion 4 4   Eversion 4 4                Special Tests: Ankle stability:  Anterior Drawer:-  Talar tilt: -  Kleiger:-    Neurological Screen:   Myotomes: strong in bilateral LE's     Dermatomes: intact in bilateral LE's         Reflexes: n/a         Neural Tension Tests: SLR (-)  Functional Mobility:    · Double leg squat: n/a  · Gait Pattern: will evaluate at a later date  Balance:  Single leg   L:   R:   Body Structures Involved:  1. Joints  2. Muscles  3. Ligaments Body Functions Affected:  1. Sensory/Pain  2. Neuromusculoskeletal  3. Movement Related Activities and Participation Affected:  1. Learning and Applying Knowledge  2. General Tasks and Demands  3. Mobility  4. Self Care  5. Community, Social and [place]   Number of elements that affect the Plan of Care: 4+: HIGH COMPLEXITY   CLINICAL PRESENTATION:   Presentation: Evolving clinical presentation with changing clinical characteristics: MODERATE COMPLEXITY   CLINICAL DECISION MAKING:   Outcome Measure: Tool Used: PT/OT FOOT AND ANKLE ABILITY MEASURE  Score:  Initial: 42/84 (10-10-17) Most Recent: X (Date: -- )   Interpretation of Score: For the \"Activities of Daily Living\", there are 21 questions each scored on a 5 point scale with 0 representing \"Unable to do\" and 4 representing \"No difficulty\". The lower the score, the greater the functional disability. 84/84 represents no disability. Minimal detectable change is 5.7 points.   With the addition of the 8 questions in the \"Sports Subscale,\" there are 29 questions, each scored on a 5 point scale with 0 representing \"Unable to do\" and 4 representing \"No difficulty\". The lower the score, the greater the functional disability. 116/116 represents no disability. Minimal detectable change is 12.3 points. Activities of Daily Living:  Score 84 83-68 67-51 50-34 33-18 17-1 0   Modifier CH CI CJ CK CL CM CN     Activities of Daily Living + Sports Subscale:  Score 116 115-94 93-71 70-47 46-24 23-1 0   Modifier CH CI CJ CK CL CM CN     ? Mobility - Walking and Moving Around:     - CURRENT STATUS: CK - 40%-59% impaired, limited or restricted    - GOAL STATUS: CJ - 20%-39% impaired, limited or restricted    - D/C STATUS:  ---------------To be determined---------------    Medical Necessity:   · Patient is expected to demonstrate progress in strength, range of motion and symptom levels to return to full function. Reason for Services/Other Comments:  · Patient continues to require skilled intervention due to ongoing symptoms. Use of outcome tool(s) and clinical judgement create a POC that gives a: Questionable prediction of patient's progress: MODERATE COMPLEXITY   TREATMENT:   (In addition to Assessment/Re-Assessment sessions the following treatments were rendered)    Subjective Pre-Treatment Symptoms: Reports that she is still struggling with her foot when it comes to balance and gait. Therapeutic Exercise: (15 min) Done in order to improve strength, ROM and understanding of current condition. Date:  10-10-17   Activity/Exercise Parameters   Education Discussed examination findings, HEP, plan of care   Self PF mobilization  Showed her  how to help her mobilize the joint    PF  Green band, 10x   Manual Therapy: (-) Done in order to improve joint and soft tissue mobility,reduce muscle guarding, and decrease muscle tone    Modalities: (-) Done in order to reduce swelling and pain    Treatment/Session Assessment:  Patient tolerated the session well. Her HEP and plan of care were discussed. · Pain: Initial:    4/10 Post Session:  3/10 ·   Compliance with Program/Exercises: Will assess as treatment progresses. · Recommendations/Intent for next treatment session: \"Next visit will focus on progressing the patients plan of care\".   Future Appointments  Date Time Provider Theo Jimenez   10/17/2017 11:30 AM Luis Sorensen, PT, DPT [facility]   10/19/2017 11:30 AM Luis Sorensen PT, DPT [facility]   10/23/2017 2:30 PM Luis Sorensen PT, DPT [facility]   10/27/2017 9:45 AM Luis Sorensen PT, DPT [facility]   10/30/2017 9:45 AM Luis Sorensen PT, DPT [facility]   1/5/2018 2:45 PM S[facility] ROOM 1 SFERMAM S[facility]     Total Treatment Duration: 15 min, evaluation 45 min  PT Patient Time In/Time Out  Time In: 0740  Time Out: Pattie Milner PT, DPT

## 2017-10-16 NOTE — THERAPY EVALUATION
Ambulatory/Rehab Services H2 Model Falls Risk Assessment    Risk Factor Pts. ·   Confusion/Disorientation/Impulsivity  []    4 ·   Symptomatic Depression  []   2 ·   Altered Elimination  []   1 ·   Dizziness/Vertigo  []   1 ·   Gender (Male)  []   1 ·   Any administered antiepileptics (anticonvulsants):  []   2 ·   Any administered benzodiazepines:  []   1 ·   Visual Impairment (specify):  []   1 ·   Portable Oxygen Use  []   1 ·   Orthostatic ? BP  []   1 ·   History of Recent Falls (within 3 mos.)  []   5     Ability to Rise from Chair (choose one) Pts. ·   Ability to rise in a single movement  [x]   0 ·   Pushes up, successful in one attempt  []   1 ·   Multiple attempts, but successful  []   3 ·   Unable to rise without assistance  []   4   Total: (5 or greater = High Risk) 0     Falls Prevention Plan:   []                Physical Limitations to Exercise (specify):   []                Mobility Assistance Device (type):   []                Exercise/Equipment Adaptation (specify):    ©2010 [facility] [address] [place] Patent #[id number].  Federal Law prohibits the replication, distribution or use without written permission from [facility] [employer]

## 2017-10-17 ENCOUNTER — HOSPITAL ENCOUNTER (OUTPATIENT)
Dept: PHYSICAL THERAPY | Age: 82
Discharge: HOME | End: 2017-10-17
Payer: MEDICARE

## 2017-10-17 PROCEDURE — 97110 THERAPEUTIC EXERCISES: CPT

## 2017-10-17 PROCEDURE — 97140 MANUAL THERAPY 1/> REGIONS: CPT

## 2017-10-17 NOTE — THERAPY EVALUATION
Stephanie Corral  : 1935  [place] / [place] / Product Type: Medicare /  Perla English at [address] [place] [address]  [phone]   [phone]         OUTPATIENT PHYSICAL THERAPY:Daily Note 10/17/2017    ICD-10: Treatment Diagnosis: DIFFICULTY WALKING, NOT ELSEWHERE CLASSIFIED R26.2; POSTERIOR TIBIAL TENDONITIS H06.119   Precautions/Allergies: non reported  Fall Risk Score: 0 (? 5 = High Risk)  MD Orders: evaluate and treat MEDICAL/REFERRING DIAGNOSIS:Left foot pain [M79.672]  DATE OF ONSET: surgery date 6-15-17  REFERRING PHYSICIAN:Miquel Maguire MD  RETURN PHYSICIAN APPOINTMENT: did not specify      INITIAL ASSESSMENT:  Mrs. Fredy Eubanks presents to physical therapy with symptoms post  left gastrocnemius recession, medial displacement calcaneal osteotomy, posterior tibial tendon debridement with flexor digitorum longus tendon transfer, spring ligament repair and cotton medial cuneiform opening wedge osteotomy. The examination reveals talocrural ROM restrictions, weakness, decreased balance and antalgic gait pattern. The examination is of moderate complexity due to an evolving clinical presentation. Skilled physical therapy is recommended to progress the plan of care in order for the patient to return to full function with minimal symptoms. PROBLEM LIST (Impacting functional limitations):  1. Decreased Strength  2. Decreased ADL/Functional Activities  3. Increased Pain  4. Decreased Activity Tolerance  5. Decreased Flexibility/Joint Mobility  6. Decreased [place] with Home Exercise Program INTERVENTIONS PLANNED:  1. Cold  2. Heat  3. Home Exercise Program (HEP)  4. Manual Therapy  5. Range of Motion (ROM)  6. Therapeutic Exercise/Strengthening     TREATMENT PLAN:  Effective Dates: 10-10-17 TO 12-10-17.     Frequency/Duration: 2 times a week for 8 weeks  GOALS: (Goals have been discussed and agreed upon with patient.)  SHORT-TERM FUNCTIONAL GOALS: Time Frame: 2-4 wks  1. Patient will report 25-50% reduction in overall symptoms  2. Patient will be compliant with HEP and plan of care  3. Patient will improve talocrural PF by 10 deg   4. Patient will improve on the FAAM by 3-5 points  DISCHARGE GOALS: Time Frame: 6-8 wk  1. Patient will report % reduction in overall symptoms in order to be able to have full function with decreased symptoms  2. Patient will be able to perform a single leg heel raise indicating improved achilles and posterior tib strength   3. Patient will report being able to perform all activities of daily living with minimal pain or compensation  4. Patient will improve on the FAAM by 10-15 points in order to demonstrate improved function with less pain    Rehabilitation Potential For Stated Goals: Good    Regarding Dimaa Hippo therapy, I certify that the treatment plan above will be carried out by a therapist or under their direction. Thank you for this referral,  Parrish Hawkins PT,DPT    Referring Physician Signature: Sierra Jara MD _____________________________________________________ Date: __________________________________          HISTORY:   History of Present Injury/Illness (Reason for Referral): reports that she fell about a yr ago and tore a ligament causing her to walk poorly. She then hurt her tendon and had surgery to fix it on 6-15.17. She has been doing exercises that were prescribed by Dr. Candice Pandya but still has issues with tightness and weakness. Feels like she can only stand about 30 minutes. She is still struggling with swelling. She has been wearing her ASO brace but feels like the pressure of it is making her worse now.  States she has tingling sensation on occasion  Aggravating factors: standing, walking  Relieving factors: rest non weight bearing    Past Medical History/Comorbidities:   Past Medical History:   Diagnosis Date    Arthritis     GERD (gastroesophageal reflux disease)     daily med    Hypertension     managed with med    Mitral valve prolapse     Gallbladder removed     Hysterectomy       Social History/Living Environment: lives in a single story house  Prior Level of Function/Work/Activity: independent   Current Medications:      Current Outpatient Prescriptions:     atenolol (TENORMIN) 50 mg tablet, Take 50 mg by mouth daily. , Disp: , Rfl:     dilTIAZem (CARDIZEM) 60 mg tablet, Take 60 mg by mouth daily. , Disp: , Rfl:     RABEprazole (ACIPHEX) 20 mg tablet, Take 20 mg by mouth daily (with lunch). Indications: gastroesophageal reflux disease, Disp: , Rfl:     gabapentin (NEURONTIN) 300 mg capsule, Take 300 mg by mouth three (3) times daily. Indications: NEUROPATHIC PAIN, Disp: , Rfl:     simvastatin (ZOCOR) 20 mg tablet, Take 20 mg by mouth nightly. Indications: hypercholesterolemia, Disp: , Rfl:     triamterene-hydroCHLOROthiazide (DYAZIDE) 37.5-25 mg per capsule, Take 1 Cap by mouth daily (with lunch). , Disp: , Rfl:   Date Last Reviewed:  10/17/2017   Number of Personal Factors/Comorbidities that affect the Plan of Care: 1-2: MODERATE COMPLEXITY   EXAMINATION:   (Abbreviations: P-pain, NP- no pain, wnl-within normal limits)  Observation/Orthostatic Postural Assessment:    Relaxed standing posture:  Bilateral femurs are in relative neutral, tibia's are externally rotated     Palpation/tone/tissue texture:    ASIS:n/a  PSIS:n/a  Leg Length: supine:n/a         Long Sitting:      Soft tissue:  · Gastroc/soleus/posterior tibialis: relatively within normal limits   · Planter fascia: mild tightness on L       Foot Exam Date:  10-10-17       Left Right   Talocrural Joint: Restricted into PF  wnl   Rear foot: (neutral to relaxed: 4-6                      deg-normal) Mild restriction wnl    Mid-foot (navicular drop, <7-normal) n/a n/a   Forefoot: (position, mobility) varus Varus, mobile    First ray position/hallux limitus test: PF, hypomobile PF, mobile   Windlass test: n/a n/a   Foot intrinsic strength:  Fair  fair       ROM:   Multisegmental ROM Date:  10-10-17       Flexion -   Extension -   Rotation L -   Rotation R -      Ankle ROM Date:  10-10-17       Right Left   DF 10 18   PF 30 17   Inversion 25 25   Eversion 20 25           Strength: Foot and ankle Strength Date:  10-10-17       Right Left   DF 4 4+   PF 4 3-   Inversion 4 4   Eversion 4 4                Special Tests: Ankle stability:  Anterior Drawer:-  Talar tilt: -  Kleiger:-    Neurological Screen:   Myotomes: strong in bilateral LE's     Dermatomes: intact in bilateral LE's         Reflexes: n/a         Neural Tension Tests: SLR (-)  Functional Mobility:    · Double leg squat: n/a  · Gait Pattern: will evaluate at a later date  Balance:  Single leg   L:   R:   Body Structures Involved:  1. Joints  2. Muscles  3. Ligaments Body Functions Affected:  1. Sensory/Pain  2. Neuromusculoskeletal  3. Movement Related Activities and Participation Affected:  1. Learning and Applying Knowledge  2. General Tasks and Demands  3. Mobility  4. Self Care  5. Community, Social and [place]   Number of elements that affect the Plan of Care: 4+: HIGH COMPLEXITY   CLINICAL PRESENTATION:   Presentation: Evolving clinical presentation with changing clinical characteristics: MODERATE COMPLEXITY   CLINICAL DECISION MAKING:   Outcome Measure: Tool Used: PT/OT FOOT AND ANKLE ABILITY MEASURE  Score:  Initial: 42/84 (10-10-17) Most Recent: X (Date: -- )   Interpretation of Score: For the \"Activities of Daily Living\", there are 21 questions each scored on a 5 point scale with 0 representing \"Unable to do\" and 4 representing \"No difficulty\". The lower the score, the greater the functional disability. 84/84 represents no disability. Minimal detectable change is 5.7 points.   With the addition of the 8 questions in the \"Sports Subscale,\" there are 29 questions, each scored on a 5 point scale with 0 representing \"Unable to do\" and 4 representing \"No difficulty\". The lower the score, the greater the functional disability. 116/116 represents no disability. Minimal detectable change is 12.3 points. Activities of Daily Living:  Score 84 83-68 67-51 50-34 33-18 17-1 0   Modifier CH CI CJ CK CL CM CN     Activities of Daily Living + Sports Subscale:  Score 116 115-94 93-71 70-47 46-24 23-1 0   Modifier CH CI CJ CK CL CM CN     ? Mobility - Walking and Moving Around:     - CURRENT STATUS: CK - 40%-59% impaired, limited or restricted    - GOAL STATUS: CJ - 20%-39% impaired, limited or restricted    - D/C STATUS:  ---------------To be determined---------------    Medical Necessity:   · Patient is expected to demonstrate progress in strength, range of motion and symptom levels to return to full function. Reason for Services/Other Comments:  · Patient continues to require skilled intervention due to ongoing symptoms. Use of outcome tool(s) and clinical judgement create a POC that gives a: Questionable prediction of patient's progress: MODERATE COMPLEXITY   TREATMENT:   (In addition to Assessment/Re-Assessment sessions the following treatments were rendered)    Subjective Pre-Treatment Symptoms: Reports that she had a bad day yesterday after being on her feet for a while. Therapeutic Exercise: (30 min) Done in order to improve strength, ROM and understanding of current condition.     Date:  10-10-17 Date  10-17-17   Activity/Exercise Parameters    Education Discussed examination findings, HEP, plan of care Discussed HEP   Self PF mobilization  Showed her  how to help her mobilize the joint  Discussed    PF  Green band, 10x Red band, end range 10x, 2 sets  Seated: max range: 20x   Inversion  Isometric, 5 sec holds,10x   Foot intrinsics  Worked, with the big toe down, 5 sec holds,10x        Manual Therapy: (15 min) Done in order to improve joint and soft tissue mobility,reduce muscle guarding, and decrease muscle tone  · Talocrural PF mobilization, grade III-IV  Modalities: (-) Done in order to reduce swelling and pain    Treatment/Session Assessment:  Patient tolerated the session well. Continuing to work on PF mobility at the talcrural joint and strength of the PF'ers and invertors. Discussed her HEP. · Pain: Initial:    4/10 Post Session:  3/10 ·   Compliance with Program/Exercises: Will assess as treatment progresses. · Recommendations/Intent for next treatment session: \"Next visit will focus on progressing the patients plan of care\".   Future Appointments  Date Time Provider Theo Jimenez   10/19/2017 11:30 AM Colon Bold, PT, DPT [facility]   10/23/2017 2:30 PM Colon Bold, PT, DPT [facility]   10/27/2017 9:45 AM Colon Bold, PT, DPT [facility]   10/30/2017 9:45 AM [facility]   1/5/2018 2:45 PM SFE [facility] ROOM 1 [facility]ER[facility] SFE     Total Treatment Duration: 45 min  PT Patient Time In/Time Out  Time In: 1100  Time Out: [address] PT, DPT

## 2017-10-19 ENCOUNTER — HOSPITAL ENCOUNTER (OUTPATIENT)
Dept: PHYSICAL THERAPY | Age: 82
Discharge: HOME | End: 2017-10-19
Payer: MEDICARE

## 2017-10-19 PROCEDURE — 97140 MANUAL THERAPY 1/> REGIONS: CPT

## 2017-10-19 PROCEDURE — 97110 THERAPEUTIC EXERCISES: CPT

## 2017-10-19 NOTE — THERAPY EVALUATION
Zo Fulton  : 1935  [place] / Product Type: Medicare /  [address] at [facility] [address]  [phone]   [phone]         OUTPATIENT PHYSICAL THERAPY:Daily Note 10/19/2017    ICD-10: Treatment Diagnosis: DIFFICULTY WALKING, NOT ELSEWHERE CLASSIFIED R26.2; POSTERIOR TIBIAL TENDONITIS A67.715   Precautions/Allergies: non reported  Fall Risk Score: 0 (? 5 = High Risk)  MD Orders: evaluate and treat MEDICAL/REFERRING DIAGNOSIS:Left foot pain [M79.672]  DATE OF ONSET: surgery date 6-15-17  REFERRING PHYSICIAN:Wu Maguire MD  RETURN PHYSICIAN APPOINTMENT: did not specify      INITIAL ASSESSMENT:  Mrs. Cherelle Ceja presents to physical therapy with symptoms post  left gastrocnemius recession, medial displacement calcaneal osteotomy, posterior tibial tendon debridement with flexor digitorum longus tendon transfer, spring ligament repair and cotton medial cuneiform opening wedge osteotomy. The examination reveals talocrural ROM restrictions, weakness, decreased balance and antalgic gait pattern. The examination is of moderate complexity due to an evolving clinical presentation. Skilled physical therapy is recommended to progress the plan of care in order for the patient to return to full function with minimal symptoms. PROBLEM LIST (Impacting functional limitations):  1. Decreased Strength  2. Decreased ADL/Functional Activities  3. Increased Pain  4. Decreased Activity Tolerance  5. Decreased Flexibility/Joint Mobility  6. Decreased [place] with Home Exercise Program INTERVENTIONS PLANNED:  1. Cold  2. Heat  3. Home Exercise Program (HEP)  4. Manual Therapy  5. Range of Motion (ROM)  6. Therapeutic Exercise/Strengthening     TREATMENT PLAN:  Effective Dates: 10-10-17 TO 12-10-17.     Frequency/Duration: 2 times a week for 8 weeks  GOALS: (Goals have been discussed and agreed upon with patient.)  SHORT-TERM FUNCTIONAL GOALS: Time Frame: 2-4 wks  1. Patient will report 25-50% reduction in overall symptoms  2. Patient will be compliant with HEP and plan of care  3. Patient will improve talocrural PF by 10 deg   4. Patient will improve on the FAAM by 3-5 points  DISCHARGE GOALS: Time Frame: 6-8 wk  1. Patient will report % reduction in overall symptoms in order to be able to have full function with decreased symptoms  2. Patient will be able to perform a single leg heel raise indicating improved achilles and posterior tib strength   3. Patient will report being able to perform all activities of daily living with minimal pain or compensation  4. Patient will improve on the FAAM by 10-15 points in order to demonstrate improved function with less pain    Rehabilitation Potential For Stated Goals: Good    Regarding Katie Doshi therapy, I certify that the treatment plan above will be carried out by a therapist or under their direction. Thank you for this referral,  Luis Khan PT,DPT    Referring Physician Signature: Priscilla Mckay MD _____________________________________________________ Date: __________________________________          HISTORY:   History of Present Injury/Illness (Reason for Referral): reports that she fell about a yr ago and tore a ligament causing her to walk poorly. She then hurt her tendon and had surgery to fix it on 6-15.17. She has been doing exercises that were prescribed by Dr. Arley Estes but still has issues with tightness and weakness. Feels like she can only stand about 30 minutes. She is still struggling with swelling. She has been wearing her ASO brace but feels like the pressure of it is making her worse now.  States she has tingling sensation on occasion  Aggravating factors: standing, walking  Relieving factors: rest non weight bearing    Past Medical History/Comorbidities:   Past Medical History:   Diagnosis Date    Arthritis     GERD (gastroesophageal reflux disease)     daily med    Hypertension     managed with med    Mitral valve prolapse     Gallbladder removed     Hysterectomy       Social History/Living Environment: lives in a single story house  Prior Level of Function/Work/Activity: independent   Current Medications:      Current Outpatient Prescriptions:     atenolol (TENORMIN) 50 mg tablet, Take 50 mg by mouth daily. , Disp: , Rfl:     dilTIAZem (CARDIZEM) 60 mg tablet, Take 60 mg by mouth daily. , Disp: , Rfl:     RABEprazole (ACIPHEX) 20 mg tablet, Take 20 mg by mouth daily (with lunch). Indications: gastroesophageal reflux disease, Disp: , Rfl:     gabapentin (NEURONTIN) 300 mg capsule, Take 300 mg by mouth three (3) times daily. Indications: NEUROPATHIC PAIN, Disp: , Rfl:     simvastatin (ZOCOR) 20 mg tablet, Take 20 mg by mouth nightly. Indications: hypercholesterolemia, Disp: , Rfl:     triamterene-hydroCHLOROthiazide (DYAZIDE) 37.5-25 mg per capsule, Take 1 Cap by mouth daily (with lunch). , Disp: , Rfl:   Date Last Reviewed:  10/19/2017   Number of Personal Factors/Comorbidities that affect the Plan of Care: 1-2: MODERATE COMPLEXITY   EXAMINATION:   (Abbreviations: P-pain, NP- no pain, wnl-within normal limits)  Observation/Orthostatic Postural Assessment:    Relaxed standing posture:  Bilateral femurs are in relative neutral, tibia's are externally rotated     Palpation/tone/tissue texture:    ASIS:n/a  PSIS:n/a  Leg Length: supine:n/a         Long Sitting:      Soft tissue:  · Gastroc/soleus/posterior tibialis: relatively within normal limits   · Planter fascia: mild tightness on L       Foot Exam Date:  10-10-17       Left Right   Talocrural Joint: Restricted into PF  wnl   Rear foot: (neutral to relaxed: 4-6                      deg-normal) Mild restriction wnl    Mid-foot (navicular drop, <7-normal) n/a n/a   Forefoot: (position, mobility) varus Varus, mobile    First ray position/hallux limitus test: PF, hypomobile PF, mobile   Windlass test: n/a n/a   Foot intrinsic strength:  Fair  fair       ROM:   Multisegmental ROM Date:  10-10-17       Flexion -   Extension -   Rotation L -   Rotation R -      Ankle ROM Date:  10-10-17       Right Left   DF 10 18   PF 30 17   Inversion 25 25   Eversion 20 25           Strength: Foot and ankle Strength Date:  10-10-17       Right Left   DF 4 4+   PF 4 3-   Inversion 4 4   Eversion 4 4                Special Tests: Ankle stability:  Anterior Drawer:-  Talar tilt: -  Kleiger:-    Neurological Screen:   Myotomes: strong in bilateral LE's     Dermatomes: intact in bilateral LE's         Reflexes: n/a         Neural Tension Tests: SLR (-)  Functional Mobility:    · Double leg squat: n/a  · Gait Pattern: will evaluate at a later date  Balance:  Single leg   L:   R:   Body Structures Involved:  1. Joints  2. Muscles  3. Ligaments Body Functions Affected:  1. Sensory/Pain  2. Neuromusculoskeletal  3. Movement Related Activities and Participation Affected:  1. Learning and Applying Knowledge  2. General Tasks and Demands  3. Mobility  4. Self Care  5. Community, Social and [place]   Number of elements that affect the Plan of Care: 4+: HIGH COMPLEXITY   CLINICAL PRESENTATION:   Presentation: Evolving clinical presentation with changing clinical characteristics: MODERATE COMPLEXITY   CLINICAL DECISION MAKING:   Outcome Measure: Tool Used: PT/OT FOOT AND ANKLE ABILITY MEASURE  Score:  Initial: 42/84 (10-10-17) Most Recent: X (Date: -- )   Interpretation of Score: For the \"Activities of Daily Living\", there are 21 questions each scored on a 5 point scale with 0 representing \"Unable to do\" and 4 representing \"No difficulty\". The lower the score, the greater the functional disability. 84/84 represents no disability. Minimal detectable change is 5.7 points.   With the addition of the 8 questions in the \"Sports Subscale,\" there are 29 questions, each scored on a 5 point scale with 0 representing \"Unable to do\" and 4 representing \"No difficulty\". The lower the score, the greater the functional disability. 116/116 represents no disability. Minimal detectable change is 12.3 points. Activities of Daily Living:  Score 84 83-68 67-51 50-34 33-18 17-1 0   Modifier CH CI CJ CK CL CM CN     Activities of Daily Living + Sports Subscale:  Score 116 115-94 93-71 70-47 46-24 23-1 0   Modifier CH CI CJ CK CL CM CN     ? Mobility - Walking and Moving Around:     - CURRENT STATUS: CK - 40%-59% impaired, limited or restricted    - GOAL STATUS: CJ - 20%-39% impaired, limited or restricted    - D/C STATUS:  ---------------To be determined---------------    Medical Necessity:   · Patient is expected to demonstrate progress in strength, range of motion and symptom levels to return to full function. Reason for Services/Other Comments:  · Patient continues to require skilled intervention due to ongoing symptoms. Use of outcome tool(s) and clinical judgement create a POC that gives a: Questionable prediction of patient's progress: MODERATE COMPLEXITY   TREATMENT:   (In addition to Assessment/Re-Assessment sessions the following treatments were rendered)    Subjective Pre-Treatment Symptoms: Reports that she is feeling looser today. States she woke up with the foot numb but it went away after taking a shower and doing her stretches. Therapeutic Exercise: (15 min) Done in order to improve strength, ROM and understanding of current condition.     Date:  10-10-17 Date  10-17-17 Date  10-19-17   Activity/Exercise Parameters     Education Discussed examination findings, HEP, plan of care Discussed HEP Discussed HEP   Self PF mobilization  Showed her  how to help her mobilize the joint  Discussed  Discussed   PF  Green band, 10x Red band, end range 10x, 2 sets  Seated: max range: 20x Red band, end range 10x, 2 sets  Seated: max range: 20x   Inversion  Isometric, 5 sec holds,10x Yellow band, 5 sec hold, 10x   Foot intrinsics  Worked, with the big toe down, 5 sec holds,10x PF with toes (focused on more intrinsic contraction), yellow band resistance, 10x, 2 sets         Manual Therapy: (15 min) Done in order to improve joint and soft tissue mobility,reduce muscle guarding, and decrease muscle tone  · Talocrural PF mobilization, grade III-IV  Modalities: (-) Done in order to reduce swelling and pain    Treatment/Session Assessment:  Patient tolerated the session well. Her talocrural PF was improved today. Still has a hard time isolating inversion. Discussed updated HEP  · Pain: Initial:    0/10 at rest Post Session:  0/10 at rest ·   Compliance with Program/Exercises: Will assess as treatment progresses. · Recommendations/Intent for next treatment session: \"Next visit will focus on progressing the patients plan of care\".   Future Appointments  Date Time Provider Theo Jimenez   10/23/2017 2:30 PM Stephanie Durant PT, DPT [facility]   10/27/2017 9:45 AM Stephanie Durant PT, DPT [facility]   10/30/2017 9:45 AM Stephanie Durant PT, DPT [facility]   1/5/2018 2:45 PM SFE [facility] ROOM 1 SFER[facility]AM SFE     Total Treatment Duration: 30 min  PT Patient Time In/Time Out  Time In: 1130  Time Out: [address] PT, DPT

## 2017-10-23 ENCOUNTER — HOSPITAL ENCOUNTER (OUTPATIENT)
Dept: PHYSICAL THERAPY | Age: 82
Discharge: HOME | End: 2017-10-23
Payer: MEDICARE

## 2017-10-23 PROCEDURE — 97140 MANUAL THERAPY 1/> REGIONS: CPT

## 2017-10-23 PROCEDURE — 97110 THERAPEUTIC EXERCISES: CPT

## 2017-10-24 NOTE — THERAPY EVALUATION
Manisha Gilmore  : 1935  [place] / [place] / Product Type: Medicare /  [address] at [facility] [address]  [phone]   [phone]         OUTPATIENT PHYSICAL THERAPY:Daily Note 10/23/2017    ICD-10: Treatment Diagnosis: DIFFICULTY WALKING, NOT ELSEWHERE CLASSIFIED R26.2; POSTERIOR TIBIAL TENDONITIS C42.074   Precautions/Allergies: non reported  Fall Risk Score: 0 (? 5 = High Risk)  MD Orders: evaluate and treat MEDICAL/REFERRING DIAGNOSIS:Left foot pain [M79.672]  DATE OF ONSET: surgery date 6-15-17  REFERRING PHYSICIAN:Womack, Colletta Slim, MD  RETURN PHYSICIAN APPOINTMENT: did not specify      INITIAL ASSESSMENT:  Mrs. Nayely Shell presents to physical therapy with symptoms post  left gastrocnemius recession, medial displacement calcaneal osteotomy, posterior tibial tendon debridement with flexor digitorum longus tendon transfer, spring ligament repair and cotton medial cuneiform opening wedge osteotomy. The examination reveals talocrural ROM restrictions, weakness, decreased balance and antalgic gait pattern. The examination is of moderate complexity due to an evolving clinical presentation. Skilled physical therapy is recommended to progress the plan of care in order for the patient to return to full function with minimal symptoms. PROBLEM LIST (Impacting functional limitations):  1. Decreased Strength  2. Decreased ADL/Functional Activities  3. Increased Pain  4. Decreased Activity Tolerance  5. Decreased Flexibility/Joint Mobility  6. Decreased [place] with Home Exercise Program INTERVENTIONS PLANNED:  1. Cold  2. Heat  3. Home Exercise Program (HEP)  4. Manual Therapy  5. Range of Motion (ROM)  6. Therapeutic Exercise/Strengthening     TREATMENT PLAN:  Effective Dates: 10-10-17 TO 12-10-17.     Frequency/Duration: 2 times a week for 8 weeks  GOALS: (Goals have been discussed and agreed upon with patient.)  SHORT-TERM FUNCTIONAL GOALS: Time Frame: 2-4 wks  1. Patient will report 25-50% reduction in overall symptoms  2. Patient will be compliant with HEP and plan of care  3. Patient will improve talocrural PF by 10 deg   4. Patient will improve on the FAAM by 3-5 points  DISCHARGE GOALS: Time Frame: 6-8 wk  1. Patient will report % reduction in overall symptoms in order to be able to have full function with decreased symptoms  2. Patient will be able to perform a single leg heel raise indicating improved achilles and posterior tib strength   3. Patient will report being able to perform all activities of daily living with minimal pain or compensation  4. Patient will improve on the FAAM by 10-15 points in order to demonstrate improved function with less pain    Rehabilitation Potential For Stated Goals: Good    Regarding Maudine Rast therapy, I certify that the treatment plan above will be carried out by a therapist or under their direction. Thank you for this referral,  Yamilet Campbell PT,DPT    Referring Physician Signature: Aleida Ray MD _____________________________________________________ Date: __________________________________          HISTORY:   History of Present Injury/Illness (Reason for Referral): reports that she fell about a yr ago and tore a ligament causing her to walk poorly. She then hurt her tendon and had surgery to fix it on 6-15.17. She has been doing exercises that were prescribed by Dr. Brenna Crawford but still has issues with tightness and weakness. Feels like she can only stand about 30 minutes. She is still struggling with swelling. She has been wearing her ASO brace but feels like the pressure of it is making her worse now.  States she has tingling sensation on occasion  Aggravating factors: standing, walking  Relieving factors: rest non weight bearing    Past Medical History/Comorbidities:   Past Medical History:   Diagnosis Date    Arthritis     GERD (gastroesophageal reflux disease)     daily med    Hypertension     managed with med    Mitral valve prolapse     Gallbladder removed     Hysterectomy       Social History/Living Environment: lives in a single story house  Prior Level of Function/Work/Activity: independent   Current Medications:      Current Outpatient Prescriptions:     atenolol (TENORMIN) 50 mg tablet, Take 50 mg by mouth daily. , Disp: , Rfl:     dilTIAZem (CARDIZEM) 60 mg tablet, Take 60 mg by mouth daily. , Disp: , Rfl:     RABEprazole (ACIPHEX) 20 mg tablet, Take 20 mg by mouth daily (with lunch). Indications: gastroesophageal reflux disease, Disp: , Rfl:     gabapentin (NEURONTIN) 300 mg capsule, Take 300 mg by mouth three (3) times daily. Indications: NEUROPATHIC PAIN, Disp: , Rfl:     simvastatin (ZOCOR) 20 mg tablet, Take 20 mg by mouth nightly. Indications: hypercholesterolemia, Disp: , Rfl:     triamterene-hydroCHLOROthiazide (DYAZIDE) 37.5-25 mg per capsule, Take 1 Cap by mouth daily (with lunch). , Disp: , Rfl:   Date Last Reviewed:  10/24/2017   Number of Personal Factors/Comorbidities that affect the Plan of Care: 1-2: MODERATE COMPLEXITY   EXAMINATION:   (Abbreviations: P-pain, NP- no pain, wnl-within normal limits)  Observation/Orthostatic Postural Assessment:    Relaxed standing posture:  Bilateral femurs are in relative neutral, tibia's are externally rotated     Palpation/tone/tissue texture:    ASIS:n/a  PSIS:n/a  Leg Length: supine:n/a         Long Sitting:      Soft tissue:  · Gastroc/soleus/posterior tibialis: relatively within normal limits   · Planter fascia: mild tightness on L       Foot Exam Date:  10-10-17       Left Right   Talocrural Joint: Restricted into PF  wnl   Rear foot: (neutral to relaxed: 4-6                      deg-normal) Mild restriction wnl    Mid-foot (navicular drop, <7-normal) n/a n/a   Forefoot: (position, mobility) varus Varus, mobile    First ray position/hallux limitus test: PF, hypomobile PF, mobile   Windlass test: n/a n/a   Foot intrinsic strength:  Fair  fair       ROM:   Multisegmental ROM Date:  10-10-17       Flexion -   Extension -   Rotation L -   Rotation R -      Ankle ROM Date:  10-10-17       Right Left   DF 10 18   PF 30 17   Inversion 25 25   Eversion 20 25           Strength: Foot and ankle Strength Date:  10-10-17       Right Left   DF 4 4+   PF 4 3-   Inversion 4 4   Eversion 4 4                Special Tests: Ankle stability:  Anterior Drawer:-  Talar tilt: -  Kleiger:-    Neurological Screen:   Myotomes: strong in bilateral LE's     Dermatomes: intact in bilateral LE's         Reflexes: n/a         Neural Tension Tests: SLR (-)  Functional Mobility:    · Double leg squat: n/a  · Gait Pattern: will evaluate at a later date  Balance:  Single leg   L:   R:   Body Structures Involved:  1. Joints  2. Muscles  3. Ligaments Body Functions Affected:  1. Sensory/Pain  2. Neuromusculoskeletal  3. Movement Related Activities and Participation Affected:  1. Learning and Applying Knowledge  2. General Tasks and Demands  3. Mobility  4. Self Care  5. Community, Social and [place]   Number of elements that affect the Plan of Care: 4+: HIGH COMPLEXITY   CLINICAL PRESENTATION:   Presentation: Evolving clinical presentation with changing clinical characteristics: MODERATE COMPLEXITY   CLINICAL DECISION MAKING:   Outcome Measure: Tool Used: PT/OT FOOT AND ANKLE ABILITY MEASURE  Score:  Initial: 42/84 (10-10-17) Most Recent: X (Date: -- )   Interpretation of Score: For the \"Activities of Daily Living\", there are 21 questions each scored on a 5 point scale with 0 representing \"Unable to do\" and 4 representing \"No difficulty\". The lower the score, the greater the functional disability. 84/84 represents no disability. Minimal detectable change is 5.7 points.   With the addition of the 8 questions in the \"Sports Subscale,\" there are 29 questions, each scored on a 5 point scale with 0 representing \"Unable to do\" and 4 representing \"No difficulty\". The lower the score, the greater the functional disability. 116/116 represents no disability. Minimal detectable change is 12.3 points. Activities of Daily Living:  Score 84 83-68 67-51 50-34 33-18 17-1 0   Modifier CH CI CJ CK CL CM CN     Activities of Daily Living + Sports Subscale:  Score 116 115-94 93-71 70-47 46-24 23-1 0   Modifier CH CI CJ CK CL CM CN     ? Mobility - Walking and Moving Around:     - CURRENT STATUS: CK - 40%-59% impaired, limited or restricted    - GOAL STATUS: CJ - 20%-39% impaired, limited or restricted    - D/C STATUS:  ---------------To be determined---------------    Medical Necessity:   · Patient is expected to demonstrate progress in strength, range of motion and symptom levels to return to full function. Reason for Services/Other Comments:  · Patient continues to require skilled intervention due to ongoing symptoms. Use of outcome tool(s) and clinical judgement create a POC that gives a: Questionable prediction of patient's progress: MODERATE COMPLEXITY   TREATMENT:   (In addition to Assessment/Re-Assessment sessions the following treatments were rendered)    Subjective Pre-Treatment Symptoms: Patient reports that she had some soreness following the session on Thursday, however the soreness went away by the weekend. Patient was able to do all exercises over the weekend and had no issues with soreness or pain. Therapeutic Exercise: (25 min) Done in order to improve strength, ROM and understanding of current condition.     Date  10-17-17 Date  10-19-17 Date  10-23-17   Activity/Exercise      Education Discussed HEP Discussed HEP Discussed HEP   Self PF mobilization  Discussed  Discussed Discussed   PF  Red band, end range 10x, 2 sets  Seated: max range: 20x Red band, end range 10x, 2 sets  Seated: max range: 20x Long Sitting: Green band, end [id number] 2 sets  Max Range: 10x 2 sets   Inversion Isometric, 5 sec holds,10x Yellow band, 5 sec hold, 10x Isometric, 10 sec hold 10x   Foot intrinsics Worked, with the big toe down, 5 sec holds,10x PF with toes (focused on more intrinsic contraction), yellow band resistance, 10x, 2 sets    Weight Shifts   Side to Side: 10sec hold 10x  Forward: 5 sec hold w/ foot intrinsic contraction 10x   Manual Therapy: (20 min) Done in order to improve joint and soft tissue mobility,reduce muscle guarding, and decrease muscle tone  · Talocrural PF mobilization, grade III-IV  · Talocrural DF mobilization, grade III-IV    Modalities: (-) Done in order to reduce swelling and pain    Treatment/Session Assessment: Patient responded well to treatment and did not complain of any soreness or pain following the session. Patient still has limitations into plantarflexion, however is close to her range on the right ankle, and has been able to maintain range better between sessions. Patient has near full dorsiflexion on left side. · Pain: Initial:    0/10 at rest Post Session:  0/10 at rest ·   Compliance with Program/Exercises: Will assess as treatment progresses. · Recommendations/Intent for next treatment session: \"Next visit will focus on progressing the patients plan of care\".   Future Appointments  Date Time Provider Theo Jimenez   10/27/2017 9:45 AM Vinayak Shelton PT, DANIE [facility]   10/30/2017 9:45 AM Vinayak Shelton PT DPBLANCA [facility]   1/5/2018 2:45 PM SFE [facility] ROOM 1 [address]     Total Treatment Duration: 45 min       Mika Cornelius PT, DPT

## 2017-10-27 ENCOUNTER — HOSPITAL ENCOUNTER (OUTPATIENT)
Dept: PHYSICAL THERAPY | Age: 82
Discharge: HOME | End: 2017-10-27
Payer: MEDICARE

## 2017-10-27 PROCEDURE — 97110 THERAPEUTIC EXERCISES: CPT

## 2017-10-27 PROCEDURE — 97140 MANUAL THERAPY 1/> REGIONS: CPT

## 2017-10-27 NOTE — THERAPY EVALUATION
Tiago Barney  : 1935  [place] / [place] / Product Type: Medicare /  [address] at [address] [facility], [address] [place] [address]  [phone]   [phone]         OUTPATIENT PHYSICAL THERAPY:Daily Note 10/27/2017    ICD-10: Treatment Diagnosis: DIFFICULTY WALKING, NOT ELSEWHERE CLASSIFIED R26.2; POSTERIOR TIBIAL TENDONITIS Q67.274   Precautions/Allergies: non reported  Fall Risk Score: 0 (? 5 = High Risk)  MD Orders: evaluate and treat MEDICAL/REFERRING DIAGNOSIS:Left foot pain [M79.672]  DATE OF ONSET: surgery date 6-15-17  REFERRING PHYSICIAN:Nikolay Maguire MD  RETURN PHYSICIAN APPOINTMENT: did not specify      INITIAL ASSESSMENT:  Mrs. Danelle Maloney presents to physical therapy with symptoms post  left gastrocnemius recession, medial displacement calcaneal osteotomy, posterior tibial tendon debridement with flexor digitorum longus tendon transfer, spring ligament repair and cotton medial cuneiform opening wedge osteotomy. The examination reveals talocrural ROM restrictions, weakness, decreased balance and antalgic gait pattern. The examination is of moderate complexity due to an evolving clinical presentation. Skilled physical therapy is recommended to progress the plan of care in order for the patient to return to full function with minimal symptoms. PROBLEM LIST (Impacting functional limitations):  1. Decreased Strength  2. Decreased ADL/Functional Activities  3. Increased Pain  4. Decreased Activity Tolerance  5. Decreased Flexibility/Joint Mobility  6. Decreased [place] with Home Exercise Program INTERVENTIONS PLANNED:  1. Cold  2. Heat  3. Home Exercise Program (HEP)  4. Manual Therapy  5. Range of Motion (ROM)  6. Therapeutic Exercise/Strengthening     TREATMENT PLAN:  Effective Dates: 10-10-17 TO 12-10-17.     Frequency/Duration: 2 times a week for 8 weeks  GOALS: (Goals have been discussed and agreed upon with patient.)  SHORT-TERM FUNCTIONAL GOALS: Time Frame: 2-4 wks  1. Patient will report 25-50% reduction in overall symptoms  2. Patient will be compliant with HEP and plan of care  3. Patient will improve talocrural PF by 10 deg   4. Patient will improve on the FAAM by 3-5 points  DISCHARGE GOALS: Time Frame: 6-8 wk  1. Patient will report % reduction in overall symptoms in order to be able to have full function with decreased symptoms  2. Patient will be able to perform a single leg heel raise indicating improved achilles and posterior tib strength   3. Patient will report being able to perform all activities of daily living with minimal pain or compensation  4. Patient will improve on the FAAM by 10-15 points in order to demonstrate improved function with less pain    Rehabilitation Potential For Stated Goals: Good    Regarding Isra Lawid therapy, I certify that the treatment plan above will be carried out by a therapist or under their direction. Thank you for this referral,  Tod Bey PT,DPT    Referring Physician Signature: Kunal Hobson MD _____________________________________________________ Date: __________________________________          HISTORY:   History of Present Injury/Illness (Reason for Referral): reports that she fell about a yr ago and tore a ligament causing her to walk poorly. She then hurt her tendon and had surgery to fix it on 6-15.17. She has been doing exercises that were prescribed by Dr. Galen Jarvis but still has issues with tightness and weakness. Feels like she can only stand about 30 minutes. She is still struggling with swelling. She has been wearing her ASO brace but feels like the pressure of it is making her worse now.  States she has tingling sensation on occasion  Aggravating factors: standing, walking  Relieving factors: rest non weight bearing    Past Medical History/Comorbidities:   Past Medical History:   Diagnosis Date    Arthritis     GERD (gastroesophageal reflux disease)     daily med    Hypertension     managed with med    Mitral valve prolapse     Gallbladder removed     Hysterectomy       Social History/Living Environment: lives in a single story house  Prior Level of Function/Work/Activity: independent   Current Medications:      Current Outpatient Prescriptions:     atenolol (TENORMIN) 50 mg tablet, Take 50 mg by mouth daily. , Disp: , Rfl:     dilTIAZem (CARDIZEM) 60 mg tablet, Take 60 mg by mouth daily. , Disp: , Rfl:     RABEprazole (ACIPHEX) 20 mg tablet, Take 20 mg by mouth daily (with lunch). Indications: gastroesophageal reflux disease, Disp: , Rfl:     gabapentin (NEURONTIN) 300 mg capsule, Take 300 mg by mouth three (3) times daily. Indications: NEUROPATHIC PAIN, Disp: , Rfl:     simvastatin (ZOCOR) 20 mg tablet, Take 20 mg by mouth nightly. Indications: hypercholesterolemia, Disp: , Rfl:     triamterene-hydroCHLOROthiazide (DYAZIDE) 37.5-25 mg per capsule, Take 1 Cap by mouth daily (with lunch). , Disp: , Rfl:   Date Last Reviewed:  10/27/2017   Number of Personal Factors/Comorbidities that affect the Plan of Care: 1-2: MODERATE COMPLEXITY   EXAMINATION:   (Abbreviations: P-pain, NP- no pain, wnl-within normal limits)  Observation/Orthostatic Postural Assessment:    Relaxed standing posture:  Bilateral femurs are in relative neutral, tibia's are externally rotated     Palpation/tone/tissue texture:    ASIS:n/a  PSIS:n/a  Leg Length: supine:n/a         Long Sitting:      Soft tissue:  · Gastroc/soleus/posterior tibialis: relatively within normal limits   · Planter fascia: mild tightness on L       Foot Exam Date:  10-10-17       Left Right   Talocrural Joint: Restricted into PF  wnl   Rear foot: (neutral to relaxed: 4-6                      deg-normal) Mild restriction wnl    Mid-foot (navicular drop, <7-normal) n/a n/a   Forefoot: (position, mobility) varus Varus, mobile    First ray position/hallux limitus test: PF, hypomobile PF, mobile   Windlass test: n/a n/a   Foot intrinsic strength:  Fair  fair       ROM:   Multisegmental ROM Date:  10-10-17       Flexion -   Extension -   Rotation L -   Rotation R -      Ankle ROM Date:  10-10-17       Right Left   DF 10 18   PF 30 17   Inversion 25 25   Eversion 20 25           Strength: Foot and ankle Strength Date:  10-10-17       Right Left   DF 4 4+   PF 4 3-   Inversion 4 4   Eversion 4 4                Special Tests: Ankle stability:  Anterior Drawer:-  Talar tilt: -  Kleiger:-    Neurological Screen:   Myotomes: strong in bilateral LE's     Dermatomes: intact in bilateral LE's         Reflexes: n/a         Neural Tension Tests: SLR (-)  Functional Mobility:    · Double leg squat: n/a  · Gait Pattern: will evaluate at a later date  Balance:  Single leg   L:   R:   Body Structures Involved:  1. Joints  2. Muscles  3. Ligaments Body Functions Affected:  1. Sensory/Pain  2. Neuromusculoskeletal  3. Movement Related Activities and Participation Affected:  1. Learning and Applying Knowledge  2. General Tasks and Demands  3. Mobility  4. Self Care  5. Community, Social and [place]   Number of elements that affect the Plan of Care: 4+: HIGH COMPLEXITY   CLINICAL PRESENTATION:   Presentation: Evolving clinical presentation with changing clinical characteristics: MODERATE COMPLEXITY   CLINICAL DECISION MAKING:   Outcome Measure: Tool Used: PT/OT FOOT AND ANKLE ABILITY MEASURE  Score:  Initial: 42/84 (10-10-17) Most Recent: X (Date: -- )   Interpretation of Score: For the \"Activities of Daily Living\", there are 21 questions each scored on a 5 point scale with 0 representing \"Unable to do\" and 4 representing \"No difficulty\". The lower the score, the greater the functional disability. 84/84 represents no disability. Minimal detectable change is 5.7 points.   With the addition of the 8 questions in the \"Sports Subscale,\" there are 29 questions, each scored on a 5 point scale with 0 representing \"Unable to do\" and 4 representing \"No difficulty\". The lower the score, the greater the functional disability. 116/116 represents no disability. Minimal detectable change is 12.3 points. Activities of Daily Living:  Score 84 83-68 67-51 50-34 33-18 17-1 0   Modifier CH CI CJ CK CL CM CN     Activities of Daily Living + Sports Subscale:  Score 116 115-94 93-71 70-47 46-24 23-1 0   Modifier CH CI CJ CK CL CM CN     ? Mobility - Walking and Moving Around:     - CURRENT STATUS: CK - 40%-59% impaired, limited or restricted    - GOAL STATUS: CJ - 20%-39% impaired, limited or restricted    - D/C STATUS:  ---------------To be determined---------------    Medical Necessity:   · Patient is expected to demonstrate progress in strength, range of motion and symptom levels to return to full function. Reason for Services/Other Comments:  · Patient continues to require skilled intervention due to ongoing symptoms. Use of outcome tool(s) and clinical judgement create a POC that gives a: Questionable prediction of patient's progress: MODERATE COMPLEXITY   TREATMENT:   (In addition to Assessment/Re-Assessment sessions the following treatments were rendered)    Subjective Pre-Treatment Symptoms: Patient reports that on the Tuesday following her Monday session she had increased pain in the lateral ankle and tightness across the bottom of the foot and up the calf. She states that the pain has since decreased from Tuesday but it comes back sporadically and still bothers her. States that she has been doing her HEP but the inversion with a band is causing her pain in the lateral ankle. Therapeutic Exercise: (30 min) Done in order to improve strength, ROM and understanding of current condition.     Date  10-19-17 Date  10-23-17 Date  10-27-17   Activity/Exercise      Education Discussed HEP Discussed HEP Discussed HEP, added weight shifts with intrinsics work, temporarily removed band exercises in favor of isometrics due to increased pain in the ankle   Self PF mobilization  Discussed Discussed    PF  Red band, end range 10x, 2 sets  Seated: max range: 20x Long Sitting: Green band, end [id number] 2 sets  Max Range: 10x 2 sets Long Sitting: isometric at mid range, 10sec hold, 2x10   Inversion Yellow band, 5 sec hold, 10x Isometric, 10 sec hold 10x    Foot intrinsics PF with toes (focused on more intrinsic contraction), yellow band resistance, 10x, 2 sets  PF with toes (focused on more intrinsic contraction), yellow band resistance, 10x, 1 set   Weight Shifts  Side to Side: 10sec hold 10x  Forward: 5 sec hold w/ foot intrinsic contraction 10x Forward: 5 sec hold w/ foot intrinsic contraction 10x   Manual Therapy: (15 min) Done in order to improve joint and soft tissue mobility,reduce muscle guarding, and decrease muscle tone  · Talocrural PF mobilization, grade III-IV  · Talocrural DF mobilization, grade III-IV (NOT DONE TODAY)  · Calcaneal distractions    Modalities: (-) Done in order to reduce swelling and pain    Treatment/Session Assessment: Patient stated that during PF mobilizations and distractions that she had increased pain in the lateral and posterior ankle, however that pain was not constant and could be relieved with mobilization as well. Isometric exercise helped to reduce the pain and the patient had improved control of the ankle joint in isometric contraction. The patient shows increased dorsiflexion of the toes during all phases of gait. Isometric of the toe flexors were demonstrated in clinic, with the patient showing good control of those muscles during weight shifts. Over the next few sessions, the patient will practice this motion while going thru all of stance phase on that limb. · Pain: Initial:    2/10 at rest Post Session:  0/10 at rest ·   Compliance with Program/Exercises: Will assess as treatment progresses. · Recommendations/Intent for next treatment session:  \"Next visit will focus on progressing the patients plan of care\".   Future Appointments  Date Time Provider Theo Barbara   10/30/2017 9:45 AM [facility]   1/5/2018 2:45 PM SFE [facility] ROOM 1 [address] [facility]     Total Treatment Duration: 45 min  PT Patient Time In/Time Out  Time In: 0950  Time Out: Frederick Broussard PT, DPT

## 2017-10-30 ENCOUNTER — HOSPITAL ENCOUNTER (OUTPATIENT)
Dept: PHYSICAL THERAPY | Age: 82
Discharge: HOME | End: 2017-10-30
Payer: MEDICARE

## 2017-10-30 PROCEDURE — 97110 THERAPEUTIC EXERCISES: CPT

## 2017-10-30 PROCEDURE — 97140 MANUAL THERAPY 1/> REGIONS: CPT

## 2017-10-30 NOTE — THERAPY EVALUATION
Gerry Heading  : 1935  [place] / [place] / Product Type: Medicare /  [address] at [address] [facility], [address] [place] [address]  [phone]   [phone]         OUTPATIENT PHYSICAL THERAPY:Daily Note 10/30/2017    ICD-10: Treatment Diagnosis: DIFFICULTY WALKING, NOT ELSEWHERE CLASSIFIED R26.2; POSTERIOR TIBIAL TENDONITIS I47.193   Precautions/Allergies: non reported  Fall Risk Score: 0 (? 5 = High Risk)  MD Orders: evaluate and treat MEDICAL/REFERRING DIAGNOSIS:Left foot pain [M79.672]  DATE OF ONSET: surgery date 6-15-17  REFERRING PHYSICIAN:Mau Maguire MD  RETURN PHYSICIAN APPOINTMENT: did not specify      INITIAL ASSESSMENT:  Mrs. Estevan Carranza presents to physical therapy with symptoms post  left gastrocnemius recession, medial displacement calcaneal osteotomy, posterior tibial tendon debridement with flexor digitorum longus tendon transfer, spring ligament repair and cotton medial cuneiform opening wedge osteotomy. The examination reveals talocrural ROM restrictions, weakness, decreased balance and antalgic gait pattern. The examination is of moderate complexity due to an evolving clinical presentation. Skilled physical therapy is recommended to progress the plan of care in order for the patient to return to full function with minimal symptoms. PROBLEM LIST (Impacting functional limitations):  1. Decreased Strength  2. Decreased ADL/Functional Activities  3. Increased Pain  4. Decreased Activity Tolerance  5. Decreased Flexibility/Joint Mobility  6. Decreased [place] with Home Exercise Program INTERVENTIONS PLANNED:  1. Cold  2. Heat  3. Home Exercise Program (HEP)  4. Manual Therapy  5. Range of Motion (ROM)  6. Therapeutic Exercise/Strengthening     TREATMENT PLAN:  Effective Dates: 10-10-17 TO 12-10-17.     Frequency/Duration: 2 times a week for 8 weeks  GOALS: (Goals have been discussed and agreed upon with patient.)  SHORT-TERM FUNCTIONAL GOALS: Time Frame: 2-4 wks  1. Patient will report 25-50% reduction in overall symptoms  2. Patient will be compliant with HEP and plan of care  3. Patient will improve talocrural PF by 10 deg   4. Patient will improve on the FAAM by 3-5 points  DISCHARGE GOALS: Time Frame: 6-8 wk  1. Patient will report % reduction in overall symptoms in order to be able to have full function with decreased symptoms  2. Patient will be able to perform a single leg heel raise indicating improved achilles and posterior tib strength   3. Patient will report being able to perform all activities of daily living with minimal pain or compensation  4. Patient will improve on the FAAM by 10-15 points in order to demonstrate improved function with less pain    Rehabilitation Potential For Stated Goals: Good    Regarding Duwasusan Levers therapy, I certify that the treatment plan above will be carried out by a therapist or under their direction. Thank you for this referral,  Angel Ferreira PT,DPT    Referring Physician Signature: Diamante Suazo MD _____________________________________________________ Date: __________________________________          HISTORY:   History of Present Injury/Illness (Reason for Referral): reports that she fell about a yr ago and tore a ligament causing her to walk poorly. She then hurt her tendon and had surgery to fix it on 6-15.17. She has been doing exercises that were prescribed by Dr. Gladys Eldridge but still has issues with tightness and weakness. Feels like she can only stand about 30 minutes. She is still struggling with swelling. She has been wearing her ASO brace but feels like the pressure of it is making her worse now.  States she has tingling sensation on occasion  Aggravating factors: standing, walking  Relieving factors: rest non weight bearing    Past Medical History/Comorbidities:   Past Medical History:   Diagnosis Date    Arthritis     GERD (gastroesophageal reflux disease)     daily med    Hypertension     managed with med    Mitral valve prolapse     Gallbladder removed     Hysterectomy       Social History/Living Environment: lives in a single story house  Prior Level of Function/Work/Activity: independent   Current Medications:      Current Outpatient Prescriptions:     atenolol (TENORMIN) 50 mg tablet, Take 50 mg by mouth daily. , Disp: , Rfl:     dilTIAZem (CARDIZEM) 60 mg tablet, Take 60 mg by mouth daily. , Disp: , Rfl:     RABEprazole (ACIPHEX) 20 mg tablet, Take 20 mg by mouth daily (with lunch). Indications: gastroesophageal reflux disease, Disp: , Rfl:     gabapentin (NEURONTIN) 300 mg capsule, Take 300 mg by mouth three (3) times daily. Indications: NEUROPATHIC PAIN, Disp: , Rfl:     simvastatin (ZOCOR) 20 mg tablet, Take 20 mg by mouth nightly. Indications: hypercholesterolemia, Disp: , Rfl:     triamterene-hydroCHLOROthiazide (DYAZIDE) 37.5-25 mg per capsule, Take 1 Cap by mouth daily (with lunch). , Disp: , Rfl:   Date Last Reviewed:  10/30/2017   Number of Personal Factors/Comorbidities that affect the Plan of Care: 1-2: MODERATE COMPLEXITY   EXAMINATION:   (Abbreviations: P-pain, NP- no pain, wnl-within normal limits)  Observation/Orthostatic Postural Assessment:    Relaxed standing posture:  Bilateral femurs are in relative neutral, tibia's are externally rotated     Palpation/tone/tissue texture:    ASIS:n/a  PSIS:n/a  Leg Length: supine:n/a         Long Sitting:      Soft tissue:  · Gastroc/soleus/posterior tibialis: relatively within normal limits   · Planter fascia: mild tightness on L       Foot Exam Date:  10-10-17       Left Right   Talocrural Joint: Restricted into PF  wnl   Rear foot: (neutral to relaxed: 4-6                      deg-normal) Mild restriction wnl    Mid-foot (navicular drop, <7-normal) n/a n/a   Forefoot: (position, mobility) varus Varus, mobile    First ray position/hallux limitus test: PF, hypomobile PF, mobile   Windlass test: n/a n/a   Foot intrinsic strength:  Fair  fair       ROM:   Multisegmental ROM Date:  10-10-17       Flexion -   Extension -   Rotation L -   Rotation R -      Ankle ROM Date:  10-10-17       Right Left   DF 10 18   PF 30 17   Inversion 25 25   Eversion 20 25           Strength: Foot and ankle Strength Date:  10-10-17       Right Left   DF 4 4+   PF 4 3-   Inversion 4 4   Eversion 4 4                Special Tests: Ankle stability:  Anterior Drawer:-  Talar tilt: -  Kleiger:-    Neurological Screen:   Myotomes: strong in bilateral LE's     Dermatomes: intact in bilateral LE's         Reflexes: n/a         Neural Tension Tests: SLR (-)  Functional Mobility:    · Double leg squat: n/a  · Gait Pattern: will evaluate at a later date  Balance:  Single leg   L:   R:   Body Structures Involved:  1. Joints  2. Muscles  3. Ligaments Body Functions Affected:  1. Sensory/Pain  2. Neuromusculoskeletal  3. Movement Related Activities and Participation Affected:  1. Learning and Applying Knowledge  2. General Tasks and Demands  3. Mobility  4. Self Care  5. Community, Social and [place]   Number of elements that affect the Plan of Care: 4+: HIGH COMPLEXITY   CLINICAL PRESENTATION:   Presentation: Evolving clinical presentation with changing clinical characteristics: MODERATE COMPLEXITY   CLINICAL DECISION MAKING:   Outcome Measure: Tool Used: PT/OT FOOT AND ANKLE ABILITY MEASURE  Score:  Initial: 42/84 (10-10-17) Most Recent: X (Date: -- )   Interpretation of Score: For the \"Activities of Daily Living\", there are 21 questions each scored on a 5 point scale with 0 representing \"Unable to do\" and 4 representing \"No difficulty\". The lower the score, the greater the functional disability. 84/84 represents no disability. Minimal detectable change is 5.7 points.   With the addition of the 8 questions in the \"Sports Subscale,\" there are 29 questions, each scored on a 5 point scale with 0 representing \"Unable to do\" and 4 representing \"No difficulty\". The lower the score, the greater the functional disability. 116/116 represents no disability. Minimal detectable change is 12.3 points. Activities of Daily Living:  Score 84 83-68 67-51 50-34 33-18 17-1 0   Modifier CH CI CJ CK CL CM CN     Activities of Daily Living + Sports Subscale:  Score 116 115-94 93-71 70-47 46-24 23-1 0   Modifier CH CI CJ CK CL CM CN     ? Mobility - Walking and Moving Around:     - CURRENT STATUS: CK - 40%-59% impaired, limited or restricted    - GOAL STATUS: CJ - 20%-39% impaired, limited or restricted    - D/C STATUS:  ---------------To be determined---------------    Medical Necessity:   · Patient is expected to demonstrate progress in strength, range of motion and symptom levels to return to full function. Reason for Services/Other Comments:  · Patient continues to require skilled intervention due to ongoing symptoms. Use of outcome tool(s) and clinical judgement create a POC that gives a: Questionable prediction of patient's progress: MODERATE COMPLEXITY   TREATMENT:   (In addition to Assessment/Re-Assessment sessions the following treatments were rendered)    Subjective Pre-Treatment Symptoms: Patient reports that she had pain and soreness on Friday throughout the day, however it went away over the weekend as she walked and performed her HEP. The pain returned this morning as she got ready for PT and was present as the appointment started. Therapeutic Exercise: (30 min) Done in order to improve strength, ROM and understanding of current condition.     Date  10-23-17 Date  10-27-17 Date  10-30-17   Activity/Exercise      Education Discussed HEP Discussed HEP, added weight shifts with intrinsics work, temporarily removed band exercises in favor of isometrics due to increased pain in the ankle Discussed HEP, added heel raises   Self PF mobilization  Discussed     DF   Long Sitting: isometric at end range, 10sec hold, 2x10   PF  Long Sitting: Green band, end [id number] 2 sets  Max Range: 10x 2 sets Long Sitting: isometric at mid range, 10sec hold, 2x10 Long Sitting: isometric at end range, 10sec hold, 2x10   Inversion Isometric, 10 sec hold 10x     Foot intrinsics  PF with toes (focused on more intrinsic contraction), yellow band resistance, 10x, 1 set Standing, towel curls 5x 30sec   Weight Shifts Side to Side: 10sec hold 10x  Forward: 5 sec hold w/ foot intrinsic contraction 10x Forward: 5 sec hold w/ foot intrinsic contraction 10x Forward: 5 sec hold w/ foot intrinsic contraction 15x   Manual Therapy: (15 min) Done in order to improve joint and soft tissue mobility,reduce muscle guarding, and decrease muscle tone  · Talocrural PF mobilization, grade III-IV  · Talocrural DF mobilization, grade III-IV (NOT DONE TODAY)  · Calcaneal distractions  · STM of gastroc/soleus    Modalities: (-) Done in order to reduce swelling and pain    Treatment/Session Assessment: Patient came to clinic with increased pain, soreness and stiffness in the L ankle, but demonstrated ankle PF almost equal to the R ankle. The patient gained equal ROM on the L and R following PF mobilizations. The patient reported tightness across the front of the ankle that was relieved with PF mobilizations and tightness in the gastroc/soleus that was relieved with STM. The patient had some increased tightness in the calf following 5 weight shifts that went away while performing the rest of the activity. Patient demonstrated better control of the toe flexors with activity and was able to perform toe curls in standing with no increases in pain or tightness. · Pain: Initial:    2/10 at rest Post Session:  0/10 at rest ·   Compliance with Program/Exercises: Will assess as treatment progresses. · Recommendations/Intent for next treatment session: \"Next visit will focus on progressing the patients plan of care\".   Future Appointments  Date Time Provider Theo Jimenez   11/6/2017 3:15 PM [facility]   11/10/2017 9:45 AM [facility], [facility]   1/5/2018 2:45 PM [facility] ROOM 1 [facility]     Total Treatment Duration: 45 min  PT Patient Time In/Time Out  Time In: 0945  Time Out: 8[address] [facility] PT, DPT

## 2017-11-06 ENCOUNTER — HOSPITAL ENCOUNTER (OUTPATIENT)
Dept: PHYSICAL THERAPY | Age: 82
Discharge: HOME | End: 2017-11-06
Payer: MEDICARE

## 2017-11-06 PROCEDURE — 97110 THERAPEUTIC EXERCISES: CPT

## 2017-11-06 PROCEDURE — 97140 MANUAL THERAPY 1/> REGIONS: CPT

## 2017-11-06 NOTE — PROGRESS NOTES
Melville Sep  : 1935  [place] / [place] / Product Type: Medicare /  [address] at [facility]2 [address]  [phone]   [phone]         OUTPATIENT PHYSICAL THERAPY:Daily Note 2017    ICD-10: Treatment Diagnosis: DIFFICULTY WALKING, NOT ELSEWHERE CLASSIFIED R26.2; POSTERIOR TIBIAL TENDONITIS B20.910   Precautions/Allergies: non reported  Fall Risk Score: 0 (? 5 = High Risk)  MD Orders: evaluate and treat MEDICAL/REFERRING DIAGNOSIS:Left foot pain [M79.672]  DATE OF ONSET: surgery date 6-15-17  REFERRING PHYSICIAN:Weston Maguire MD  RETURN PHYSICIAN APPOINTMENT: did not specify      INITIAL ASSESSMENT:  Mrs. Francis presents to physical therapy with symptoms post  left gastrocnemius recession, medial displacement calcaneal osteotomy, posterior tibial tendon debridement with flexor digitorum longus tendon transfer, spring ligament repair and cotton medial cuneiform opening wedge osteotomy. The examination reveals talocrural ROM restrictions, weakness, decreased balance and antalgic gait pattern. The examination is of moderate complexity due to an evolving clinical presentation. Skilled physical therapy is recommended to progress the plan of care in order for the patient to return to full function with minimal symptoms. PROBLEM LIST (Impacting functional limitations):  1. Decreased Strength  2. Decreased ADL/Functional Activities  3. Increased Pain  4. Decreased Activity Tolerance  5. Decreased Flexibility/Joint Mobility  6. Decreased [place] with Home Exercise Program INTERVENTIONS PLANNED:  1. Cold  2. Heat  3. Home Exercise Program (HEP)  4. Manual Therapy  5. Range of Motion (ROM)  6. Therapeutic Exercise/Strengthening     TREATMENT PLAN:  Effective Dates: 10-10-17 TO 12-10-17.     Frequency/Duration: 2 times a week for 8 weeks  GOALS: (Goals have been discussed and agreed upon with patient.)  SHORT-TERM FUNCTIONAL GOALS: Time Frame: 2-4 wks  1. Patient will report 25-50% reduction in overall symptoms  2. Patient will be compliant with HEP and plan of care  3. Patient will improve talocrural PF by 10 deg   4. Patient will improve on the FAAM by 3-5 points  DISCHARGE GOALS: Time Frame: 6-8 wk  1. Patient will report % reduction in overall symptoms in order to be able to have full function with decreased symptoms  2. Patient will be able to perform a single leg heel raise indicating improved achilles and posterior tib strength   3. Patient will report being able to perform all activities of daily living with minimal pain or compensation  4. Patient will improve on the FAAM by 10-15 points in order to demonstrate improved function with less pain    Rehabilitation Potential For Stated Goals: Good    Regarding Tiago Barney therapy, I certify that the treatment plan above will be carried out by a therapist or under their direction. Thank you for this referral,  Jennifer Mccabe PT,DPT    Referring Physician Signature: Fe Vega MD _____________________________________________________ Date: __________________________________          HISTORY:   History of Present Injury/Illness (Reason for Referral): reports that she fell about a yr ago and tore a ligament causing her to walk poorly. She then hurt her tendon and had surgery to fix it on 6-15.17. She has been doing exercises that were prescribed by Dr. Chign Giles but still has issues with tightness and weakness. Feels like she can only stand about 30 minutes. She is still struggling with swelling. She has been wearing her ASO brace but feels like the pressure of it is making her worse now.  States she has tingling sensation on occasion  Aggravating factors: standing, walking  Relieving factors: rest non weight bearing    Past Medical History/Comorbidities:   Past Medical History:   Diagnosis Date    Arthritis     GERD (gastroesophageal reflux disease)     daily med    Hypertension     managed with med    Mitral valve prolapse     Gallbladder removed     Hysterectomy       Social History/Living Environment: lives in a single story house  Prior Level of Function/Work/Activity: independent   Current Medications:      Current Outpatient Prescriptions:     atenolol (TENORMIN) 50 mg tablet, Take 50 mg by mouth daily. , Disp: , Rfl:     dilTIAZem (CARDIZEM) 60 mg tablet, Take 60 mg by mouth daily. , Disp: , Rfl:     RABEprazole (ACIPHEX) 20 mg tablet, Take 20 mg by mouth daily (with lunch). Indications: gastroesophageal reflux disease, Disp: , Rfl:     gabapentin (NEURONTIN) 300 mg capsule, Take 300 mg by mouth three (3) times daily. Indications: NEUROPATHIC PAIN, Disp: , Rfl:     simvastatin (ZOCOR) 20 mg tablet, Take 20 mg by mouth nightly. Indications: hypercholesterolemia, Disp: , Rfl:     triamterene-hydroCHLOROthiazide (DYAZIDE) 37.5-25 mg per capsule, Take 1 Cap by mouth daily (with lunch). , Disp: , Rfl:   Date Last Reviewed:  11/6/2017   Number of Personal Factors/Comorbidities that affect the Plan of Care: 1-2: MODERATE COMPLEXITY   EXAMINATION:   (Abbreviations: P-pain, NP- no pain, wnl-within normal limits)  Observation/Orthostatic Postural Assessment:    Relaxed standing posture:  Bilateral femurs are in relative neutral, tibia's are externally rotated     Palpation/tone/tissue texture:    ASIS:n/a  PSIS:n/a  Leg Length: supine:n/a         Long Sitting:      Soft tissue:  · Gastroc/soleus/posterior tibialis: relatively within normal limits   · Planter fascia: mild tightness on L       Foot Exam Date:  10-10-17       Left Right   Talocrural Joint: Restricted into PF  wnl   Rear foot: (neutral to relaxed: 4-6                      deg-normal) Mild restriction wnl    Mid-foot (navicular drop, <7-normal) n/a n/a   Forefoot: (position, mobility) varus Varus, mobile    First ray position/hallux limitus test: PF, hypomobile PF, mobile   Windlass test: n/a n/a   Foot intrinsic strength:  Fair  fair       ROM:   Multisegmental ROM Date:  10-10-17       Flexion -   Extension -   Rotation L -   Rotation R -      Ankle ROM Date:  10-10-17       Right Left   DF 10 18   PF 30 17   Inversion 25 25   Eversion 20 25           Strength: Foot and ankle Strength Date:  10-10-17       Right Left   DF 4 4+   PF 4 3-   Inversion 4 4   Eversion 4 4                Special Tests: Ankle stability:  Anterior Drawer:-  Talar tilt: -  Kleiger:-    Neurological Screen:   Myotomes: strong in bilateral LE's     Dermatomes: intact in bilateral LE's         Reflexes: n/a         Neural Tension Tests: SLR (-)  Functional Mobility:    · Double leg squat: n/a  · Gait Pattern: will evaluate at a later date  Balance:  Single leg   L:   R:   Body Structures Involved:  1. Joints  2. Muscles  3. Ligaments Body Functions Affected:  1. Sensory/Pain  2. Neuromusculoskeletal  3. Movement Related Activities and Participation Affected:  1. Learning and Applying Knowledge  2. General Tasks and Demands  3. Mobility  4. Self Care  5. Community, Social and [place]   Number of elements that affect the Plan of Care: 4+: HIGH COMPLEXITY   CLINICAL PRESENTATION:   Presentation: Evolving clinical presentation with changing clinical characteristics: MODERATE COMPLEXITY   CLINICAL DECISION MAKING:   Outcome Measure: Tool Used: PT/OT FOOT AND ANKLE ABILITY MEASURE  Score:  Initial: 42/84 (10-10-17) Most Recent: X (Date: -- )   Interpretation of Score: For the \"Activities of Daily Living\", there are 21 questions each scored on a 5 point scale with 0 representing \"Unable to do\" and 4 representing \"No difficulty\". The lower the score, the greater the functional disability. 84/84 represents no disability. Minimal detectable change is 5.7 points.   With the addition of the 8 questions in the \"Sports Subscale,\" there are 29 questions, each scored on a 5 point scale with 0 representing \"Unable to do\" and 4 representing \"No difficulty\". The lower the score, the greater the functional disability. 116/116 represents no disability. Minimal detectable change is 12.3 points. Activities of Daily Living:  Score 84 83-68 67-51 50-34 33-18 17-1 0   Modifier CH CI CJ CK CL CM CN     Activities of Daily Living + Sports Subscale:  Score 116 115-94 93-71 70-47 46-24 23-1 0   Modifier CH CI CJ CK CL CM CN     ? Mobility - Walking and Moving Around:     - CURRENT STATUS: CK - 40%-59% impaired, limited or restricted    - GOAL STATUS: CJ - 20%-39% impaired, limited or restricted    - D/C STATUS:  ---------------To be determined---------------    Medical Necessity:   · Patient is expected to demonstrate progress in strength, range of motion and symptom levels to return to full function. Reason for Services/Other Comments:  · Patient continues to require skilled intervention due to ongoing symptoms. Use of outcome tool(s) and clinical judgement create a POC that gives a: Questionable prediction of patient's progress: MODERATE COMPLEXITY   TREATMENT:   (In addition to Assessment/Re-Assessment sessions the following treatments were rendered)    Subjective Pre-Treatment Symptoms: Patient reports that last Thursday she rolled her ankle in while walking to the mailbox and has had increased pain from then until yesterday evening. This morning she woke up and the pain had almost fully gone away, but she states she still feels some tightness and tenderness around the medial side of the foot. Therapeutic Exercise: (30 min) Done in order to improve strength, ROM and understanding of current condition.     Date  10-27-17 Date  10-30-17 Date  11-6-17   Activity/Exercise      Education Discussed HEP, added weight shifts with intrinsics work, temporarily removed band exercises in favor of isometrics due to increased pain in the ankle Discussed HEP, added heel raises Discussed HEP, advised patient to stop exercise and ice ankle multiple times per day if soreness continues   Self PF mobilization       DF  Long Sitting: isometric at end range, 10sec hold, 2x10    PF  Long Sitting: isometric at mid range, 10sec hold, 2x10 Long Sitting: isometric at end range, 10sec hold, 2x10 Long Sitting: isometric at end range, 10sec hold, 2x10   Inversion   2x w/ 5 sec hold (stopped due to pain)   Foot intrinsics PF with toes (focused on more intrinsic contraction), yellow band resistance, 10x, 1 set Standing, towel curls 5x 30sec Standing, towel curls 5x 30sec   Weight Shifts Forward: 5 sec hold w/ foot intrinsic contraction 10x Forward: 5 sec hold w/ foot intrinsic contraction 15x    Manual Therapy: (15 min) Done in order to improve joint and soft tissue mobility,reduce muscle guarding, and decrease muscle tone  · Talocrural PF mobilization, grade III-IV  · Talocrural DF mobilization, grade III-IV (NOT DONE TODAY)  · Calcaneal distractions  · STM of gastroc/soleus (NOT DONE TODAY)    Modalities: (10 min) Done in order to reduce swelling and pain  · Ice pack on L ankle    Treatment/Session Assessment: Patient came to PT today with slightly increased pain and tenderness in the L ankle, specifically the medial aspect of the ankle. Patient reported that she had significantly reduced pain from this weekend, but still has some tenderness on the ankle. Patient had increased ROM into plantarflexion and was almost even with the R ankle. Patient had more control of the toes when plantarflexing, and showed increased strength and control of the ankle. Patient had increased pain with some exercise, so they were stopped during the session to not increase symptoms. · Pain: Initial:    3/10 at rest Post Session:  1-2/10 at rest ·   Compliance with Program/Exercises: Will assess as treatment progresses. · Recommendations/Intent for next treatment session: \"Next visit will focus on progressing the patients plan of care\".   Future Appointments  Date Time Provider Theo Jimenez   11/10/2017 9:45 AM Darin Brock, PT, DPT [facility]   1/5/2018 2:45 PM SFE [facility] ROOM 1 SFER[facility]FE     Total Treatment Duration: 55 min  PT Patient Time In/Time Out  Time In: 4328  Time Out: Dawood Wood 1471 PT, DPT

## 2017-11-10 ENCOUNTER — HOSPITAL ENCOUNTER (OUTPATIENT)
Dept: PHYSICAL THERAPY | Age: 82
Discharge: HOME | End: 2017-11-10
Payer: MEDICARE

## 2017-11-10 PROCEDURE — 97110 THERAPEUTIC EXERCISES: CPT

## 2017-11-10 PROCEDURE — 97140 MANUAL THERAPY 1/> REGIONS: CPT

## 2017-11-10 NOTE — PROGRESS NOTES
Josy Telles  : 1935  [place] / [place] / Product Type: Medicare /  [address] at [facility]2 [address]  [phone]   [phone]         OUTPATIENT PHYSICAL THERAPY:Daily Note 11/10/2017    ICD-10: Treatment Diagnosis: DIFFICULTY WALKING, NOT ELSEWHERE CLASSIFIED R26.2; POSTERIOR TIBIAL TENDONITIS E51.345   Precautions/Allergies: non reported  Fall Risk Score: 0 (? 5 = High Risk)  MD Orders: evaluate and treat MEDICAL/REFERRING DIAGNOSIS:Left foot pain [M79.672]  DATE OF ONSET: surgery date 6-15-17  REFERRING PHYSICIAN:Jennifer Maguire MD  RETURN PHYSICIAN APPOINTMENT: did not specify      INITIAL ASSESSMENT:  Mrs. BARROSO [facility] presents to physical therapy with symptoms post  left gastrocnemius recession, medial displacement calcaneal osteotomy, posterior tibial tendon debridement with flexor digitorum longus tendon transfer, spring ligament repair and cotton medial cuneiform opening wedge osteotomy. The examination reveals talocrural ROM restrictions, weakness, decreased balance and antalgic gait pattern. The examination is of moderate complexity due to an evolving clinical presentation. Skilled physical therapy is recommended to progress the plan of care in order for the patient to return to full function with minimal symptoms. PROBLEM LIST (Impacting functional limitations):  1. Decreased Strength  2. Decreased ADL/Functional Activities  3. Increased Pain  4. Decreased Activity Tolerance  5. Decreased Flexibility/Joint Mobility  6. Decreased [facility] with Home Exercise Program INTERVENTIONS PLANNED:  1. Cold  2. Heat  3. Home Exercise Program (HEP)  4. Manual Therapy  5. Range of Motion (ROM)  6. Therapeutic Exercise/Strengthening     TREATMENT PLAN:  Effective Dates: 10-10-17 TO 12-10-17.     Frequency/Duration: 2 times a week for 8 weeks  GOALS: (Goals have been discussed and agreed upon with patient.)  SHORT-TERM FUNCTIONAL GOALS: Time Frame: 2-4 wks  1. Patient will report 25-50% reduction in overall symptoms  2. Patient will be compliant with HEP and plan of care  3. Patient will improve talocrural PF by 10 deg   4. Patient will improve on the FAAM by 3-5 points  DISCHARGE GOALS: Time Frame: 6-8 wk  1. Patient will report % reduction in overall symptoms in order to be able to have full function with decreased symptoms  2. Patient will be able to perform a single leg heel raise indicating improved achilles and posterior tib strength   3. Patient will report being able to perform all activities of daily living with minimal pain or compensation  4. Patient will improve on the FAAM by 10-15 points in order to demonstrate improved function with less pain    Rehabilitation Potential For Stated Goals: Good    Regarding Elvera Primus therapy, I certify that the treatment plan above will be carried out by a therapist or under their direction. Thank you for this referral,  Lesly Dhaliwal PT,DPT    Referring Physician Signature: Meredith Perez MD _____________________________________________________ Date: __________________________________          HISTORY:   History of Present Injury/Illness (Reason for Referral): reports that she fell about a yr ago and tore a ligament causing her to walk poorly. She then hurt her tendon and had surgery to fix it on 6-15.17. She has been doing exercises that were prescribed by Dr. Bessy Stevens but still has issues with tightness and weakness. Feels like she can only stand about 30 minutes. She is still struggling with swelling. She has been wearing her ASO brace but feels like the pressure of it is making her worse now.  States she has tingling sensation on occasion  Aggravating factors: standing, walking  Relieving factors: rest non weight bearing    Past Medical History/Comorbidities:   Past Medical History:   Diagnosis Date    Arthritis     GERD (gastroesophageal reflux disease)     daily med    Hypertension     managed with med    Mitral valve prolapse     Gallbladder removed     Hysterectomy       Social History/Living Environment: lives in a single story house  Prior Level of Function/Work/Activity: independent   Current Medications:      Current Outpatient Prescriptions:     atenolol (TENORMIN) 50 mg tablet, Take 50 mg by mouth daily. , Disp: , Rfl:     dilTIAZem (CARDIZEM) 60 mg tablet, Take 60 mg by mouth daily. , Disp: , Rfl:     RABEprazole (ACIPHEX) 20 mg tablet, Take 20 mg by mouth daily (with lunch). Indications: gastroesophageal reflux disease, Disp: , Rfl:     gabapentin (NEURONTIN) 300 mg capsule, Take 300 mg by mouth three (3) times daily. Indications: NEUROPATHIC PAIN, Disp: , Rfl:     simvastatin (ZOCOR) 20 mg tablet, Take 20 mg by mouth nightly. Indications: hypercholesterolemia, Disp: , Rfl:     triamterene-hydroCHLOROthiazide (DYAZIDE) 37.5-25 mg per capsule, Take 1 Cap by mouth daily (with lunch). , Disp: , Rfl:   Date Last Reviewed:  11/10/2017   Number of Personal Factors/Comorbidities that affect the Plan of Care: 1-2: MODERATE COMPLEXITY   EXAMINATION:   (Abbreviations: P-pain, NP- no pain, wnl-within normal limits)  Observation/Orthostatic Postural Assessment:    Relaxed standing posture:  Bilateral femurs are in relative neutral, tibia's are externally rotated     Palpation/tone/tissue texture:    ASIS:n/a  PSIS:n/a  Leg Length: supine:n/a         Long Sitting:      Soft tissue:  · Gastroc/soleus/posterior tibialis: relatively within normal limits   · Planter fascia: mild tightness on L       Foot Exam Date:  10-10-17       Left Right   Talocrural Joint: Restricted into PF  wnl   Rear foot: (neutral to relaxed: 4-6                      deg-normal) Mild restriction wnl    Mid-foot (navicular drop, <7-normal) n/a n/a   Forefoot: (position, mobility) varus Varus, mobile    First ray position/hallux limitus test: PF, hypomobile PF, mobile   Windlass test: n/a n/a   Foot intrinsic strength:  Fair  fair       ROM:   Multisegmental ROM Date:  10-10-17       Flexion -   Extension -   Rotation L -   Rotation R -      Ankle ROM Date:  10-10-17       Right Left   DF 10 18   PF 30 17   Inversion 25 25   Eversion 20 25           Strength: Foot and ankle Strength Date:  10-10-17       Right Left   DF 4 4+   PF 4 3-   Inversion 4 4   Eversion 4 4                Special Tests: Ankle stability:  Anterior Drawer:-  Talar tilt: -  Kleiger:-    Neurological Screen:   Myotomes: strong in bilateral LE's     Dermatomes: intact in bilateral LE's         Reflexes: n/a         Neural Tension Tests: SLR (-)  Functional Mobility:    · Double leg squat: n/a  · Gait Pattern: will evaluate at a later date  Balance:  Single leg   L:   R:   Body Structures Involved:  1. Joints  2. Muscles  3. Ligaments Body Functions Affected:  1. Sensory/Pain  2. Neuromusculoskeletal  3. Movement Related Activities and Participation Affected:  1. Learning and Applying Knowledge  2. General Tasks and Demands  3. Mobility  4. Self Care  5. Community, Social and [place]   Number of elements that affect the Plan of Care: 4+: HIGH COMPLEXITY   CLINICAL PRESENTATION:   Presentation: Evolving clinical presentation with changing clinical characteristics: MODERATE COMPLEXITY   CLINICAL DECISION MAKING:   Outcome Measure: Tool Used: PT/OT FOOT AND ANKLE ABILITY MEASURE  Score:  Initial: 42/84 (10-10-17) Most Recent: X (Date: -- )   Interpretation of Score: For the \"Activities of Daily Living\", there are 21 questions each scored on a 5 point scale with 0 representing \"Unable to do\" and 4 representing \"No difficulty\". The lower the score, the greater the functional disability. 84/84 represents no disability. Minimal detectable change is 5.7 points.   With the addition of the 8 questions in the \"Sports Subscale,\" there are 29 questions, each scored on a 5 point scale with 0 representing \"Unable to do\" and 4 representing \"No difficulty\". The lower the score, the greater the functional disability. 116/116 represents no disability. Minimal detectable change is 12.3 points. Activities of Daily Living:  Score 84 83-68 67-51 50-34 33-18 17-1 0   Modifier CH CI CJ CK CL CM CN     Activities of Daily Living + Sports Subscale:  Score 116 115-94 93-71 70-47 46-24 23-1 0   Modifier CH CI CJ CK CL CM CN     ? Mobility - Walking and Moving Around:     - CURRENT STATUS: CK - 40%-59% impaired, limited or restricted    - GOAL STATUS: CJ - 20%-39% impaired, limited or restricted    - D/C STATUS:  ---------------To be determined---------------    Medical Necessity:   · Patient is expected to demonstrate progress in strength, range of motion and symptom levels to return to full function. Reason for Services/Other Comments:  · Patient continues to require skilled intervention due to ongoing symptoms. Use of outcome tool(s) and clinical judgement create a POC that gives a: Questionable prediction of patient's progress: MODERATE COMPLEXITY   TREATMENT:   (In addition to Assessment/Re-Assessment sessions the following treatments were rendered)    Subjective Pre-Treatment Symptoms: Patient reports that following her PT session on Tuesday, she had increased pain in her L ankle and was unable to do much that day. She states that she removed the insole in her shoe and that has since made her feel much better. She has no complaints of soreness or pain today. Therapeutic Exercise: (30 min) Done in order to improve strength, ROM and understanding of current condition.     Date  10-30-17 Date  11-6-17    Activity/Exercise      Education Discussed HEP, added heel raises Discussed HEP, advised patient to stop exercise and ice ankle multiple times per day if soreness continues Discussed HEP, added heel raises   DF Long Sitting: isometric at end range, 10sec hold, 2x10  -   PF  Long Sitting: isometric at end range, 10sec hold, 2x10 Long Sitting: isometric at end range, 10sec hold, 2x10 Long Sitting: full ROM, 10 sec hold with toe curls, Blue TB 2x 10   Inversion  2x w/ 5 sec hold (stopped due to pain) Not done today b/c pt still recovering from rolling ankle   Foot intrinsics Standing, towel curls 5x 30sec Standing, towel curls 5x 30sec -   Heel Raises   50/50 weight distribution, 3x10   Knees to Bars   On Airex, 2x10   Weight Shifts Forward: 5 sec hold w/ foot intrinsic contraction 15x  On Airex, side to side w/ 10 sec hold 2x10   Manual Therapy: (15 min) Done in order to improve joint and soft tissue mobility,reduce muscle guarding, and decrease muscle tone  · Talocrural PF mobilization, grade III-IV  · Talocrural DF mobilization, grade III-IV (NOT DONE TODAY)  · Calcaneal distractions  · STM of gastroc/soleus (NOT DONE TODAY)  · Tibial/ Fibula AP glides    Modalities: (0 min) Done in order to reduce swelling and pain  ·     Treatment/Session Assessment: Patient came to PT today still recovering from rolling her ankle a week ago and with complaints of soreness and pain during the week but none this morning. During manual therapy she stated she only felt stretching of the calf muscles and the capsule, no pain. During exercise she stated that she had some soreness in her calf, but nothing in her ankle or the bottom of her foot. The patient has shown increased and maintained ROM of the ankle, so PT will move more into a strengthening phase. · Pain: Initial:    3/10 at rest Post Session:  1-2/10 at rest ·   Compliance with Program/Exercises: Will assess as treatment progresses. · Recommendations/Intent for next treatment session: \"Next visit will focus on progressing the patients plan of care\".   Future Appointments  Date Time Provider Theo Barbara   11/13/2017 1:45 PM El Torres PT, DPT [facility]   11/17/2017 9:00 AM El Torres PT, DPT [facility]   1/5/2018 2:45 PM SFE [facility]  ROOM 1 HARSHAD TREADWELLE     Total Treatment Duration: 45 min  PT Patient Time In/Time Out  Time In: 0930  Time Out: [address] PT, DPT

## 2017-11-13 ENCOUNTER — HOSPITAL ENCOUNTER (OUTPATIENT)
Dept: PHYSICAL THERAPY | Age: 82
Discharge: HOME | End: 2017-11-13
Payer: MEDICARE

## 2017-11-13 PROCEDURE — 97110 THERAPEUTIC EXERCISES: CPT

## 2017-11-13 PROCEDURE — 97140 MANUAL THERAPY 1/> REGIONS: CPT

## 2017-11-13 NOTE — PROGRESS NOTES
Fabio Ramos  : 1935  [place] / Product Type: Medicare /  [address] at [facility]2 [address]  [phone]   [phone]         OUTPATIENT PHYSICAL THERAPY:Daily Note 2017    ICD-10: Treatment Diagnosis: DIFFICULTY WALKING, NOT ELSEWHERE CLASSIFIED R26.2; POSTERIOR TIBIAL TENDONITIS T10.346   Precautions/Allergies: non reported  Fall Risk Score: 0 (? 5 = High Risk)  MD Orders: evaluate and treat MEDICAL/REFERRING DIAGNOSIS:Left foot pain [M79.672]  DATE OF ONSET: surgery date 6-15-17  REFERRING PHYSICIAN:Raya Maguire MD  RETURN PHYSICIAN APPOINTMENT: did not specify      INITIAL ASSESSMENT:  Mrs. Mikey Yin presents to physical therapy with symptoms post  left gastrocnemius recession, medial displacement calcaneal osteotomy, posterior tibial tendon debridement with flexor digitorum longus tendon transfer, spring ligament repair and cotton medial cuneiform opening wedge osteotomy. The examination reveals talocrural ROM restrictions, weakness, decreased balance and antalgic gait pattern. The examination is of moderate complexity due to an evolving clinical presentation. Skilled physical therapy is recommended to progress the plan of care in order for the patient to return to full function with minimal symptoms. PROBLEM LIST (Impacting functional limitations):  1. Decreased Strength  2. Decreased ADL/Functional Activities  3. Increased Pain  4. Decreased Activity Tolerance  5. Decreased Flexibility/Joint Mobility  6. Decreased [place] with Home Exercise Program INTERVENTIONS PLANNED:  1. Cold  2. Heat  3. Home Exercise Program (HEP)  4. Manual Therapy  5. Range of Motion (ROM)  6. Therapeutic Exercise/Strengthening     TREATMENT PLAN:  Effective Dates: 10-10-17 TO 12-10-17.     Frequency/Duration: 2 times a week for 8 weeks  GOALS: (Goals have been discussed and agreed upon with patient.)  SHORT-TERM FUNCTIONAL GOALS: Time Frame: 2-4 wks  1. Patient will report 25-50% reduction in overall symptoms  2. Patient will be compliant with HEP and plan of care  3. Patient will improve talocrural PF by 10 deg   4. Patient will improve on the FAAM by 3-5 points  DISCHARGE GOALS: Time Frame: 6-8 wk  1. Patient will report % reduction in overall symptoms in order to be able to have full function with decreased symptoms  2. Patient will be able to perform a single leg heel raise indicating improved achilles and posterior tib strength   3. Patient will report being able to perform all activities of daily living with minimal pain or compensation  4. Patient will improve on the FAAM by 10-15 points in order to demonstrate improved function with less pain    Rehabilitation Potential For Stated Goals: Good    Regarding Manisha Duet therapy, I certify that the treatment plan above will be carried out by a therapist or under their direction. Thank you for this referral,  Tila Ibanez PT,DPT    Referring Physician Signature: Pema Ivan MD _____________________________________________________ Date: __________________________________          HISTORY:   History of Present Injury/Illness (Reason for Referral): reports that she fell about a yr ago and tore a ligament causing her to walk poorly. She then hurt her tendon and had surgery to fix it on 6-15.17. She has been doing exercises that were prescribed by Dr. Aicha Riley but still has issues with tightness and weakness. Feels like she can only stand about 30 minutes. She is still struggling with swelling. She has been wearing her ASO brace but feels like the pressure of it is making her worse now.  States she has tingling sensation on occasion  Aggravating factors: standing, walking  Relieving factors: rest non weight bearing    Past Medical History/Comorbidities:   Past Medical History:   Diagnosis Date    Arthritis     GERD (gastroesophageal reflux disease)     daily med    Hypertension     managed with med    Mitral valve prolapse     Gallbladder removed     Hysterectomy       Social History/Living Environment: lives in a single story house  Prior Level of Function/Work/Activity: independent   Current Medications:      Current Outpatient Prescriptions:     atenolol (TENORMIN) 50 mg tablet, Take 50 mg by mouth daily. , Disp: , Rfl:     dilTIAZem (CARDIZEM) 60 mg tablet, Take 60 mg by mouth daily. , Disp: , Rfl:     RABEprazole (ACIPHEX) 20 mg tablet, Take 20 mg by mouth daily (with lunch). Indications: gastroesophageal reflux disease, Disp: , Rfl:     gabapentin (NEURONTIN) 300 mg capsule, Take 300 mg by mouth three (3) times daily. Indications: NEUROPATHIC PAIN, Disp: , Rfl:     simvastatin (ZOCOR) 20 mg tablet, Take 20 mg by mouth nightly. Indications: hypercholesterolemia, Disp: , Rfl:     triamterene-hydroCHLOROthiazide (DYAZIDE) 37.5-25 mg per capsule, Take 1 Cap by mouth daily (with lunch). , Disp: , Rfl:   Date Last Reviewed:  11/13/2017   Number of Personal Factors/Comorbidities that affect the Plan of Care: 1-2: MODERATE COMPLEXITY   EXAMINATION:   (Abbreviations: P-pain, NP- no pain, wnl-within normal limits)  Observation/Orthostatic Postural Assessment:    Relaxed standing posture:  Bilateral femurs are in relative neutral, tibia's are externally rotated     Palpation/tone/tissue texture:    ASIS:n/a  PSIS:n/a  Leg Length: supine:n/a         Long Sitting:      Soft tissue:  · Gastroc/soleus/posterior tibialis: relatively within normal limits   · Planter fascia: mild tightness on L       Foot Exam Date:  10-10-17       Left Right   Talocrural Joint: Restricted into PF  wnl   Rear foot: (neutral to relaxed: 4-6                      deg-normal) Mild restriction wnl    Mid-foot (navicular drop, <7-normal) n/a n/a   Forefoot: (position, mobility) varus Varus, mobile    First ray position/hallux limitus test: PF, hypomobile PF, mobile   Windlass test: n/a n/a   Foot intrinsic strength:  Fair  fair       ROM:   Multisegmental ROM Date:  10-10-17       Flexion -   Extension -   Rotation L -   Rotation R -      Ankle ROM Date:  10-10-17       Right Left   DF 10 18   PF 30 17   Inversion 25 25   Eversion 20 25           Strength: Foot and ankle Strength Date:  10-10-17       Right Left   DF 4 4+   PF 4 3-   Inversion 4 4   Eversion 4 4                Special Tests: Ankle stability:  Anterior Drawer:-  Talar tilt: -  Kleiger:-    Neurological Screen:   Myotomes: strong in bilateral LE's     Dermatomes: intact in bilateral LE's         Reflexes: n/a         Neural Tension Tests: SLR (-)  Functional Mobility:    · Double leg squat: n/a  · Gait Pattern: will evaluate at a later date  Balance:  Single leg   L:   R:   Body Structures Involved:  1. Joints  2. Muscles  3. Ligaments Body Functions Affected:  1. Sensory/Pain  2. Neuromusculoskeletal  3. Movement Related Activities and Participation Affected:  1. Learning and Applying Knowledge  2. General Tasks and Demands  3. Mobility  4. Self Care  5. Community, Social and [place]   Number of elements that affect the Plan of Care: 4+: HIGH COMPLEXITY   CLINICAL PRESENTATION:   Presentation: Evolving clinical presentation with changing clinical characteristics: MODERATE COMPLEXITY   CLINICAL DECISION MAKING:   Outcome Measure: Tool Used: PT/OT FOOT AND ANKLE ABILITY MEASURE  Score:  Initial: 42/84 (10-10-17) Most Recent: X (Date: -- )   Interpretation of Score: For the \"Activities of Daily Living\", there are 21 questions each scored on a 5 point scale with 0 representing \"Unable to do\" and 4 representing \"No difficulty\". The lower the score, the greater the functional disability. 84/84 represents no disability. Minimal detectable change is 5.7 points.   With the addition of the 8 questions in the \"Sports Subscale,\" there are 29 questions, each scored on a 5 point scale with 0 representing \"Unable to do\" and 4 representing \"No difficulty\". The lower the score, the greater the functional disability. 116/116 represents no disability. Minimal detectable change is 12.3 points. Activities of Daily Living:  Score 84 83-68 67-51 50-34 33-18 17-1 0   Modifier CH CI CJ CK CL CM CN     Activities of Daily Living + Sports Subscale:  Score 116 115-94 93-71 70-47 46-24 23-1 0   Modifier CH CI CJ CK CL CM CN     ? Mobility - Walking and Moving Around:     - CURRENT STATUS: CK - 40%-59% impaired, limited or restricted    - GOAL STATUS: CJ - 20%-39% impaired, limited or restricted    - D/C STATUS:  ---------------To be determined---------------    Medical Necessity:   · Patient is expected to demonstrate progress in strength, range of motion and symptom levels to return to full function. Reason for Services/Other Comments:  · Patient continues to require skilled intervention due to ongoing symptoms. Use of outcome tool(s) and clinical judgement create a POC that gives a: Questionable prediction of patient's progress: MODERATE COMPLEXITY   TREATMENT:   (In addition to Assessment/Re-Assessment sessions the following treatments were rendered)    Subjective Pre-Treatment Symptoms: Patient reports that she has not had much soreness or pain over the weekend, except for some slight discomfort in the ankle from increased walking on Sunday and wearing flats. She has been doing all her exercises with no problem. Therapeutic Exercise: (30 min) Done in order to improve strength, ROM and understanding of current condition.     Date  11-6-17 Date  11-10-17 Date  11-13-17   Activity/Exercise      Education Discussed HEP, advised patient to stop exercise and ice ankle multiple times per day if soreness continues Discussed HEP, added heel raises Discussed HEP   PF  Long Sitting: isometric at end range, 10sec hold, 2x10 Long Sitting: full ROM, 10 sec hold with toe curls, Blue TB 2x 10 Long Sitting: full ROM, 10 sec hold with toe curls, Blue TB 2x 10   Inversion 2x w/ 5 sec hold (stopped due to pain) Not done today b/c pt still recovering from rolling ankle W/ yellow TB, 3x 15, required some tactile cueing   Foot intrinsics Standing, towel curls 5x 30sec -    Heel Raises  50/50 weight distribution, 3x10 50/50 weight up, weight shift to L, down with both, 2x 10   Knees to Bars  On Airex, 2x10 -   Single Leg Balance   On Airex, L foot 5x 10 sec   Weight Shifts  On Airex, side to side w/ 10 sec hold 2x10 -   Manual Therapy: (10 min) Done in order to improve joint and soft tissue mobility,reduce muscle guarding, and decrease muscle tone  · Talocrural PF mobilization, grade III-IV  · Talocrural DF mobilization, grade III-IV (NOT DONE TODAY)  · Calcaneal distractions  · STM of gastroc/soleus (NOT DONE TODAY)  · Tibial/ Fibula AP glides (NOT DONE TODAY)    Modalities: (0 min) Done in order to reduce swelling and pain  ·     Treatment/Session Assessment: Patient came to PT today with no pain or soreness in the ankle. She stated she felt some soreness yesterday from walking in flats, but her exercises have caused no pain or soreness. Patient has continued to maintain full ROM in her L ankle and continues to gain strength in all movement. Patient was able to complete inversion exercises for the first time without pain today during the session. · Pain: Initial:    1/10 at rest Post Session:  1/10 at rest ·   Compliance with Program/Exercises: Will assess as treatment progresses. · Recommendations/Intent for next treatment session: \"Next visit will focus on progressing the patients plan of care\".   Future Appointments  Date Time Provider Theo Jimenez   11/17/2017 9:00 AM Kenia Rice, PT, DPT [facility]   1/5/2018 2:45 PM [facility] ROOM 1 [address]     Total Treatment Duration: 40 min  PT Patient Time In/Time Out  Time In: 1345  Time Out: 1100 Orestes Mosquera PT, DPT

## 2017-11-17 ENCOUNTER — HOSPITAL ENCOUNTER (OUTPATIENT)
Dept: PHYSICAL THERAPY | Age: 82
Discharge: HOME | End: 2017-11-17
Payer: MEDICARE

## 2017-11-17 PROCEDURE — G8979 MOBILITY GOAL STATUS: HCPCS

## 2017-11-17 PROCEDURE — 97164 PT RE-EVAL EST PLAN CARE: CPT

## 2017-11-17 PROCEDURE — G8978 MOBILITY CURRENT STATUS: HCPCS

## 2017-11-17 PROCEDURE — 97110 THERAPEUTIC EXERCISES: CPT

## 2017-11-17 PROCEDURE — 97140 MANUAL THERAPY 1/> REGIONS: CPT

## 2017-11-17 NOTE — THERAPY RECERTIFICATION
Chris Collins  : 1935  [place] / [place] / Product Type: Medicare /  [address]  at [facility]2 [facility], [address] [place] [address]  [phone]   [phone]         OUTPATIENT PHYSICAL THERAPY:Daily Note and Re-evaluation 2017    ICD-10: Treatment Diagnosis: DIFFICULTY WALKING, NOT ELSEWHERE CLASSIFIED R26.2; POSTERIOR TIBIAL TENDONITIS X97.857   Precautions/Allergies: non reported  Fall Risk Score: 0 (? 5 = High Risk)  MD Orders: evaluate and treat MEDICAL/REFERRING DIAGNOSIS:Left foot pain [M79.672]  DATE OF ONSET: surgery date 6-15-17  REFERRING PHYSICIAN:Kiley Maguire MD  RETURN PHYSICIAN APPOINTMENT: did not specify      ASSESSMENT:  Mrs. Charlane Kawasaki has completed 10 sessions of physical therapy, including her initial assessment. Mrs. Charlane Kawasaki has continued to gains range of motion and strength of the ankle, however still has increased pain in her ankle when performing repetitive inversion tasks, especially with resistance. Mrs. Charlane Kawasaki continues to improve with her walking ability and does not have pain in her ankle when walking and performing exercises in weight bearing. She states that she feels she is 35% better since her initial treatment session. Skilled physical therapy is recommended to progress the plan of care in order for the patient to return to full function with minimal symptoms. PROBLEM LIST (Impacting functional limitations):  1. Decreased Strength  2. Decreased ADL/Functional Activities  3. Increased Pain  4. Decreased Activity Tolerance  5. Decreased Flexibility/Joint Mobility  6. Decreased [place] with Home Exercise Program INTERVENTIONS PLANNED:  1. Cold  2. Heat  3. Home Exercise Program (HEP)  4. Manual Therapy  5. Range of Motion (ROM)  6. Therapeutic Exercise/Strengthening     TREATMENT PLAN:  Effective Dates: 17 TO 18.     Frequency/Duration: 2 times a week for 8 weeks  GOALS: (Goals have been discussed and agreed upon with patient.)  SHORT-TERM FUNCTIONAL GOALS: Time Frame: 2-4 wks  1. Patient will report 25-50% reduction in overall symptoms (MET)  2. Patient will be compliant with HEP and plan of care (MET)  3. Patient will improve talocrural PF by 10 deg (MET)  4. Patient will improve on the FAAM by 3-5 points (MET)  DISCHARGE GOALS: Time Frame: 6-8 wk  1. Patient will report % reduction in overall symptoms in order to be able to have full function with decreased symptoms (ongoing)   2. Patient will be able to perform a single leg heel raise indicating improved achilles and posterior tib strength  (ongoing)   3. Patient will report being able to perform all activities of daily living with minimal pain or compensation (ongoing)  4. Patient will improve on the FAAM by 10-15 points in order to demonstrate improved function with less pain (MET)    Rehabilitation Potential For Stated Goals: Good    Regarding Tiago Barney therapy, I certify that the treatment plan above will be carried out by a therapist or under their direction. Thank you for this referral,  Jennifer Mccabe PT,DPT    Referring Physician Signature: Fe Vega MD _____________________________________________________ Date: __________________________________          HISTORY:   History of Present Injury/Illness (Reason for Referral): reports that she fell about a yr ago and tore a ligament causing her to walk poorly. She then hurt her tendon and had surgery to fix it on 6-15.17. She has been doing exercises that were prescribed by Dr. Ching Giles but still has issues with tightness and weakness. Feels like she can only stand about 30 minutes. She is still struggling with swelling. She has been wearing her ASO brace but feels like the pressure of it is making her worse now.  States she has tingling sensation on occasion  Aggravating factors: standing, walking  Relieving factors: rest non weight bearing    Past Medical History/Comorbidities:   Past Medical History:   Diagnosis Date    Arthritis     GERD (gastroesophageal reflux disease)     daily med    Hypertension     managed with med    Mitral valve prolapse     Gallbladder removed     Hysterectomy       Social History/Living Environment: lives in a single story house  Prior Level of Function/Work/Activity: independent   Current Medications:      Current Outpatient Prescriptions:     atenolol (TENORMIN) 50 mg tablet, Take 50 mg by mouth daily. , Disp: , Rfl:     dilTIAZem (CARDIZEM) 60 mg tablet, Take 60 mg by mouth daily. , Disp: , Rfl:     RABEprazole (ACIPHEX) 20 mg tablet, Take 20 mg by mouth daily (with lunch). Indications: gastroesophageal reflux disease, Disp: , Rfl:     gabapentin (NEURONTIN) 300 mg capsule, Take 300 mg by mouth three (3) times daily. Indications: NEUROPATHIC PAIN, Disp: , Rfl:     simvastatin (ZOCOR) 20 mg tablet, Take 20 mg by mouth nightly. Indications: hypercholesterolemia, Disp: , Rfl:     triamterene-hydroCHLOROthiazide (DYAZIDE) 37.5-25 mg per capsule, Take 1 Cap by mouth daily (with lunch). , Disp: , Rfl:   Date Last Reviewed:  11/17/2017   Number of Personal Factors/Comorbidities that affect the Plan of Care: 1-2: MODERATE COMPLEXITY   EXAMINATION:   (Abbreviations: P-pain, NP- no pain, wnl-within normal limits)  Observation/Orthostatic Postural Assessment:    Relaxed standing posture:  Bilateral femurs are in relative neutral, tibia's are externally rotated     Palpation/tone/tissue texture:    ASIS:n/a  PSIS:n/a  Leg Length: supine:n/a         Long Sitting:      Soft tissue:  · Gastroc/soleus/posterior tibialis: slightly increased tone near the muscle-tendon junction in the L achilles/gastroc/soleus  · Planter fascia: mild tightness on L      Foot Exam Date:  10-10-17    Date  11-17-17     Left Right Left Right   Talocrural Joint: Restricted into PF  wnl WNL WNL   Rear foot: (neutral to relaxed: 4-6 deg-normal) Mild restriction wnl  Mild restriction into EV WNL   Mid-foot (navicular drop, <7-normal) n/a n/a N/A N/A   Forefoot: (position, mobility) varus Varus, mobile  Varus, mobile Varus, mobile   First ray position/hallux limitus test: PF, hypomobile PF, mobile PF, mobile PF, mobile   Windlass test: n/a n/a N/A N/A   Foot intrinsic strength:  Fair  fair Fair Fair       ROM:   Multisegmental ROM Date:  10-10-17       Flexion -   Extension -   Rotation L -   Rotation R -      Ankle ROM Date:  10-10-17    Date  11-17-17    Right Left Left   DF 10 18 16   PF 30 17 35   Inversion 25 25 23   Eversion 20 25 20           Strength: Foot and ankle Strength Date:  10-10-17    Date  11-17-17    Right Left Left   DF 4 4+ 5   PF 4 3- 3+   Inversion 4 4 5   Eversion 4 4 5                Special Tests: Ankle stability:  Anterior Drawer:-  Talar tilt: -  Kleiger:-    Neurological Screen:   Myotomes: strong in bilateral LE's     Dermatomes: intact in bilateral LE's         Reflexes: n/a         Neural Tension Tests:  Functional Mobility:    · Double leg squat: n/a  · Gait Pattern: will evaluate at a later date  Balance:  Single leg   L:   R:   CLINICAL DECISION MAKING:   Outcome Measure: Tool Used: PT/OT FOOT AND ANKLE ABILITY MEASURE  Score:  Initial: 42/84 (10-10-17) Most Recent: 72/84 (Date: 11-17-17 )   Interpretation of Score: For the \"Activities of Daily Living\", there are 21 questions each scored on a 5 point scale with 0 representing \"Unable to do\" and 4 representing \"No difficulty\". The lower the score, the greater the functional disability. 84/84 represents no disability. Minimal detectable change is 5.7 points. With the addition of the 8 questions in the \"Sports Subscale,\" there are 29 questions, each scored on a 5 point scale with 0 representing \"Unable to do\" and 4 representing \"No difficulty\". The lower the score, the greater the functional disability. 116/116 represents no disability.   Minimal detectable change is 12.3 points. Activities of Daily Living:  Score 84 83-68 67-51 50-34 33-18 17-1 0   Modifier CH CI CJ CK CL CM CN     Activities of Daily Living + Sports Subscale:  Score 116 115-94 93-71 70-47 46-24 23-1 0   Modifier CH CI CJ CK CL CM CN     ? Mobility - Walking and Moving Around:     - CURRENT STATUS: CI - 1%-19% impaired, limited or restricted    - GOAL STATUS: CI - 1%-19% impaired, limited or restricted    - D/C STATUS:  ---------------To be determined---------------    Medical Necessity:   · Patient is expected to demonstrate progress in strength, range of motion and symptom levels to return to full function. Reason for Services/Other Comments:  · Patient continues to require skilled intervention due to ongoing symptoms. TREATMENT:   (In addition to Assessment/Re-Assessment sessions the following treatments were rendered)    Subjective Pre-Treatment Symptoms: Patient reports that she has had some increased pain and soreness in her foot and ankle since the last session. She states that she has been active walking around and doing her exercises, and also received the flu and pneumonia vaccinations, and since then has had some increased pain on the bottom, inside, and outside of her foot/ankle. Therapeutic Exercise: (10 min) Done in order to improve strength, ROM and understanding of current condition.     Date  11-10-17 Date  11-13-17 Date  11-17-17   Activity/Exercise      Education Discussed HEP, added heel raises Discussed HEP Discussed HEP, removed inversion exercises   PF  Long Sitting: full ROM, 10 sec hold with toe curls, Blue TB 2x 10 Long Sitting: full ROM, 10 sec hold with toe curls, Blue TB 2x 10 Long Sitting: Full ROM concentric, eccentric w/ toe curls, 10x  At end range PF, 10x  At end range DF, 10x   Inversion Not done today b/c pt still recovering from rolling ankle W/ yellow TB, 3x 15, required some tactile cueing -   Heel Raises 50/50 weight distribution, 3x10 50/50 weight up, weight shift to L, down with both, 2x 10 50/50 weight up, weight shift to L, down with both, 10x   Knees to Bars On Airex, 2x10 - -   Single Leg Balance  On Airex, L foot 5x 10 sec -   Weight Shifts On Airex, side to side w/ 10 sec hold 2x10 - -   Manual Therapy: (20 min) Done in order to improve joint and soft tissue mobility,reduce muscle guarding, and decrease muscle tone  · Talocrural PF mobilization, grade III-IV  · Talocrural DF mobilization, grade III-IV (NOT DONE TODAY)  · Calcaneal distractions  · STM of gastroc/soleus (NOT DONE TODAY)  · Tibial/ Fibula AP glides   · Manual stretch of the gastroc/soleus, w/ some hold-relax  · Talonavicular and 1st Ray PF mobilizations, grades III-IV    Modalities: (0 min) Done in order to reduce swelling and pain  ·     Treatment/Session Assessment: Patient came to PT today with some increased pain and soreness in the ankle. The ankle was also slightly more restricted into PF than in previous sessions. The restrictions lessened with PF mobilizations of the talocrural, talonavicular and 1st ray mobilizations and patient regains ROM in the ankle equal to the right into PF. She had no pain during the mobilizations and had no pain during all PF exercises, both on the table and in weight bearing. · Pain: Initial:    2-3/10 at rest Post Session:  1/10 at rest ·   Compliance with Program/Exercises: Will assess as treatment progresses. · Recommendations/Intent for next treatment session: \"Next visit will focus on progressing the patients plan of care\".   Future Appointments  Date Time Provider Theo Jimenez   11/27/2017 1:45 PM Estephanie Solorzano PT, DPT [facility]   1/5/2018 2:45 PM [facility]     Total Treatment Duration: 55 min, 45 min treatment, 10 min re-eval  PT Patient Time In/Time Out  Time In: 0900  Time Out: Dayana ANAND, DPT

## 2017-11-27 ENCOUNTER — HOSPITAL ENCOUNTER (OUTPATIENT)
Dept: PHYSICAL THERAPY | Age: 82
Discharge: HOME | End: 2017-11-27
Payer: MEDICARE

## 2017-11-27 PROCEDURE — 97140 MANUAL THERAPY 1/> REGIONS: CPT

## 2017-11-27 PROCEDURE — 97110 THERAPEUTIC EXERCISES: CPT

## 2017-11-27 NOTE — PROGRESS NOTES
Kun Kelly  : 1935  [place] / [place] / Product Type: Medicare /  [address]  at [address] [place] [address]  [phone]   [phone]         OUTPATIENT PHYSICAL THERAPY:Daily Note 2017    ICD-10: Treatment Diagnosis: DIFFICULTY WALKING, NOT ELSEWHERE CLASSIFIED R26.2; POSTERIOR TIBIAL TENDONITIS M94.438   Precautions/Allergies: non reported  Fall Risk Score: 0 (? 5 = High Risk)  MD Orders: evaluate and treat MEDICAL/REFERRING DIAGNOSIS:Left foot pain [M79.672]  DATE OF ONSET: surgery date 6-15-17  REFERRING PHYSICIAN:Ras Maguire MD  RETURN PHYSICIAN APPOINTMENT: did not specify      ASSESSMENT:  Mrs. Dorys Malhotra has completed 10 sessions of physical therapy, including her initial assessment. Mrs. Dorys Malhotra has continued to gains range of motion and strength of the ankle, however still has increased pain in her ankle when performing repetitive inversion tasks, especially with resistance. Mrs. Dorys Malhotra continues to improve with her walking ability and does not have pain in her ankle when walking and performing exercises in weight bearing. She states that she feels she is 35% better since her initial treatment session. Skilled physical therapy is recommended to progress the plan of care in order for the patient to return to full function with minimal symptoms. PROBLEM LIST (Impacting functional limitations):  1. Decreased Strength  2. Decreased ADL/Functional Activities  3. Increased Pain  4. Decreased Activity Tolerance  5. Decreased Flexibility/Joint Mobility  6. Decreased [place] with Home Exercise Program INTERVENTIONS PLANNED:  1. Cold  2. Heat  3. Home Exercise Program (HEP)  4. Manual Therapy  5. Range of Motion (ROM)  6. Therapeutic Exercise/Strengthening     TREATMENT PLAN:  Effective Dates: 17 TO 18.     Frequency/Duration: 2 times a week for 8 weeks  GOALS: (Goals have been discussed and agreed upon with patient.)  SHORT-TERM FUNCTIONAL GOALS: Time Frame: 2-4 wks  1. Patient will report 25-50% reduction in overall symptoms (MET)  2. Patient will be compliant with HEP and plan of care (MET)  3. Patient will improve talocrural PF by 10 deg (MET)  4. Patient will improve on the FAAM by 3-5 points (MET)  DISCHARGE GOALS: Time Frame: 6-8 wk  1. Patient will report % reduction in overall symptoms in order to be able to have full function with decreased symptoms (ongoing)   2. Patient will be able to perform a single leg heel raise indicating improved achilles and posterior tib strength  (ongoing)   3. Patient will report being able to perform all activities of daily living with minimal pain or compensation (ongoing)  4. Patient will improve on the FAAM by 10-15 points in order to demonstrate improved function with less pain (MET)    Rehabilitation Potential For Stated Goals: Good    Regarding Duwaine Levers therapy, I certify that the treatment plan above will be carried out by a therapist or under their direction. Thank you for this referral,  Angel Ferreira PT,DPT    Referring Physician Signature: Diamante Suazo MD _____________________________________________________ Date: __________________________________          HISTORY:   History of Present Injury/Illness (Reason for Referral): reports that she fell about a yr ago and tore a ligament causing her to walk poorly. She then hurt her tendon and had surgery to fix it on 6-15.17. She has been doing exercises that were prescribed by Dr. Gladys Eldridge but still has issues with tightness and weakness. Feels like she can only stand about 30 minutes. She is still struggling with swelling. She has been wearing her ASO brace but feels like the pressure of it is making her worse now.  States she has tingling sensation on occasion  Aggravating factors: standing, walking  Relieving factors: rest non weight bearing    Past Medical History/Comorbidities:   Past Medical History:   Diagnosis Date    Arthritis     GERD (gastroesophageal reflux disease)     daily med    Hypertension     managed with med    Mitral valve prolapse     Gallbladder removed     Hysterectomy       Social History/Living Environment: lives in a single story house  Prior Level of Function/Work/Activity: independent   Current Medications:      Current Outpatient Prescriptions:     atenolol (TENORMIN) 50 mg tablet, Take 50 mg by mouth daily. , Disp: , Rfl:     dilTIAZem (CARDIZEM) 60 mg tablet, Take 60 mg by mouth daily. , Disp: , Rfl:     RABEprazole (ACIPHEX) 20 mg tablet, Take 20 mg by mouth daily (with lunch). Indications: gastroesophageal reflux disease, Disp: , Rfl:     gabapentin (NEURONTIN) 300 mg capsule, Take 300 mg by mouth three (3) times daily. Indications: NEUROPATHIC PAIN, Disp: , Rfl:     simvastatin (ZOCOR) 20 mg tablet, Take 20 mg by mouth nightly. Indications: hypercholesterolemia, Disp: , Rfl:     triamterene-hydroCHLOROthiazide (DYAZIDE) 37.5-25 mg per capsule, Take 1 Cap by mouth daily (with lunch). , Disp: , Rfl:   Date Last Reviewed:  11/27/2017   Number of Personal Factors/Comorbidities that affect the Plan of Care: 1-2: MODERATE COMPLEXITY   EXAMINATION:   (Abbreviations: P-pain, NP- no pain, wnl-within normal limits)  Observation/Orthostatic Postural Assessment:    Relaxed standing posture:  Bilateral femurs are in relative neutral, tibia's are externally rotated     Palpation/tone/tissue texture:    ASIS:n/a  PSIS:n/a  Leg Length: supine:n/a         Long Sitting:      Soft tissue:  · Gastroc/soleus/posterior tibialis: slightly increased tone near the muscle-tendon junction in the L achilles/gastroc/soleus  · Planter fascia: mild tightness on L      Foot Exam Date:  10-10-17    Date  11-17-17     Left Right Left Right   Talocrural Joint: Restricted into PF  wnl WNL WNL   Rear foot: (neutral to relaxed: 4-6                      deg-normal) Mild restriction wnl  Mild restriction into EV WNL   Mid-foot (navicular drop, <7-normal) n/a n/a N/A N/A   Forefoot: (position, mobility) varus Varus, mobile  Varus, mobile Varus, mobile   First ray position/hallux limitus test: PF, hypomobile PF, mobile PF, mobile PF, mobile   Windlass test: n/a n/a N/A N/A   Foot intrinsic strength:  Fair  fair Fair Fair       ROM:   Multisegmental ROM Date:  10-10-17       Flexion -   Extension -   Rotation L -   Rotation R -      Ankle ROM Date:  10-10-17    Date  11-17-17    Right Left Left   DF 10 18 16   PF 30 17 35   Inversion 25 25 23   Eversion 20 25 20           Strength: Foot and ankle Strength Date:  10-10-17    Date  11-17-17    Right Left Left   DF 4 4+ 5   PF 4 3- 3+   Inversion 4 4 5   Eversion 4 4 5                Special Tests: Ankle stability:  Anterior Drawer:-  Talar tilt: -  Kleiger:-    Neurological Screen:   Myotomes: strong in bilateral LE's     Dermatomes: intact in bilateral LE's         Reflexes: n/a         Neural Tension Tests:  Functional Mobility:    · Double leg squat: n/a  · Gait Pattern: will evaluate at a later date  Balance:  Single leg   L:   R:   CLINICAL DECISION MAKING:   Outcome Measure: Tool Used: PT/OT FOOT AND ANKLE ABILITY MEASURE  Score:  Initial: 42/84 (10-10-17) Most Recent: 72/84 (Date: 11-17-17 )   Interpretation of Score: For the \"Activities of Daily Living\", there are 21 questions each scored on a 5 point scale with 0 representing \"Unable to do\" and 4 representing \"No difficulty\". The lower the score, the greater the functional disability. 84/84 represents no disability. Minimal detectable change is 5.7 points. With the addition of the 8 questions in the \"Sports Subscale,\" there are 29 questions, each scored on a 5 point scale with 0 representing \"Unable to do\" and 4 representing \"No difficulty\". The lower the score, the greater the functional disability. 116/116 represents no disability.   Minimal detectable change is 12.3 points. Activities of Daily Living:  Score 84 83-68 67-51 50-34 33-18 17-1 0   Modifier CH CI CJ CK CL CM CN     Activities of Daily Living + Sports Subscale:  Score 116 115-94 93-71 70-47 46-24 23-1 0   Modifier CH CI CJ CK CL CM CN     ? Mobility - Walking and Moving Around:     - CURRENT STATUS: CI - 1%-19% impaired, limited or restricted    - GOAL STATUS: CI - 1%-19% impaired, limited or restricted    - D/C STATUS:  ---------------To be determined---------------    Medical Necessity:   · Patient is expected to demonstrate progress in strength, range of motion and symptom levels to return to full function. Reason for Services/Other Comments:  · Patient continues to require skilled intervention due to ongoing symptoms. TREATMENT:   (In addition to Assessment/Re-Assessment sessions the following treatments were rendered)    Subjective Pre-Treatment Symptoms: Patient reports that she has had no increases in pain during the last week from her exercises or activity. The only pain she has had is from trying a new pair of shoes that she says were too firm for her foot. Since going back to her old shoes, her pain has decreased and is significantly decreased today. Therapeutic Exercise: (30 min) Done in order to improve strength, ROM and understanding of current condition.     Date  11-13-17 Date  11-17-17 Date  11-27-17   Activity/Exercise      Education Discussed HEP Discussed HEP, removed inversion exercises Discussed, added inversion and PNF   PF  Long Sitting: full ROM, 10 sec hold with toe curls, Blue TB 2x 10 Long Sitting: Full ROM concentric, eccentric w/ toe curls, 10x  At end range PF, 10x  At end range DF, 10x Discussed   Inversion W/ yellow TB, 3x 15, required some tactile cueing - No resistance, only AROM   Heel Raises 50/50 weight up, weight shift to L, down with both, 2x 10 50/50 weight up, weight shift to L, down with both, 10x 50/50 weight up, weight shift to L, down with both, 2x10   PNF - - On R side, D1 extension pattern focusing on knee ext and ankle PF in gait pattern, 2x10  Same pattern w/ red TB, 2x10   Single Leg Balance On Airex, L foot 5x 10 sec - Discussed   Manual Therapy: (15 min) Done in order to improve joint and soft tissue mobility,reduce muscle guarding, and decrease muscle tone  · Talocrural PF mobilization, grade III-IV  · Talocrural DF mobilization, grade III-IV (NOT DONE TODAY)  · Calcaneal distractions (NOT DONE TODAY)  · STM of gastroc/soleus (NOT DONE TODAY)  · Tibial/ Fibula AP glides  (NOT DONE TODAY)  · Manual stretch of the gastroc/soleus, w/ some hold-relax  · Talonavicular and 1st Ray PF mobilizations, grades III-IV (NOT DONE TODAY)    Modalities: (0 min) Done in order to reduce swelling and pain  ·     Treatment/Session Assessment: Patient came to PT today with minimal pain in her left ankle. Her ROM is improved on the left and is equal to the R in all motions except eversion. The patient continues to gain strength in her L calf muscles and foot. Patient still has a slight limp with walking at terminal stance on the L.    · Pain: Initial:    1-2/10 at rest Post Session:  1/10 at rest ·   Compliance with Program/Exercises: Will assess as treatment progresses. · Recommendations/Intent for next treatment session: \"Next visit will focus on progressing the patients plan of care\".   Future Appointments  Date Time Provider Theo Jimenez   12/1/2017 1:45 PM Mavis Torres, PT, DPT [facility]   1/5/2018 2:45 PM [facility]     Total Treatment Duration: 45 min  PT Patient Time In/Time Out  Time In: 1345  Time Out: [facility]-10 PT, DPT

## 2017-12-01 ENCOUNTER — HOSPITAL ENCOUNTER (OUTPATIENT)
Dept: PHYSICAL THERAPY | Age: 82
Discharge: HOME | End: 2017-12-01
Payer: MEDICARE

## 2017-12-01 PROCEDURE — 97140 MANUAL THERAPY 1/> REGIONS: CPT

## 2017-12-01 PROCEDURE — 97110 THERAPEUTIC EXERCISES: CPT

## 2017-12-01 NOTE — PROGRESS NOTES
Ronit Sharp  : 1935  [place] / Product Type: Medicare /  [address]  at [address] [place] [address]  [phone]   [phone]         OUTPATIENT PHYSICAL THERAPY:Daily Note 2017    ICD-10: Treatment Diagnosis: DIFFICULTY WALKING, NOT ELSEWHERE CLASSIFIED R26.2; POSTERIOR TIBIAL TENDONITIS V75.312   Precautions/Allergies: non reported  Fall Risk Score: 0 (? 5 = High Risk)  MD Orders: evaluate and treat MEDICAL/REFERRING DIAGNOSIS:Left foot pain [M79.672]  DATE OF ONSET: surgery date 6-15-17  REFERRING PHYSICIAN:Abelardo Maguire MD  RETURN PHYSICIAN APPOINTMENT: did not specify      ASSESSMENT:  Mrs. Dione Perdomo has completed 10 sessions of physical therapy, including her initial assessment. Mrs. Dione Perdomo has continued to gains range of motion and strength of the ankle, however still has increased pain in her ankle when performing repetitive inversion tasks, especially with resistance. Mrs. Dione Perdomo continues to improve with her walking ability and does not have pain in her ankle when walking and performing exercises in weight bearing. She states that she feels she is 35% better since her initial treatment session. Skilled physical therapy is recommended to progress the plan of care in order for the patient to return to full function with minimal symptoms. PROBLEM LIST (Impacting functional limitations):  1. Decreased Strength  2. Decreased ADL/Functional Activities  3. Increased Pain  4. Decreased Activity Tolerance  5. Decreased Flexibility/Joint Mobility  6. Decreased [place] with Home Exercise Program INTERVENTIONS PLANNED:  1. Cold  2. Heat  3. Home Exercise Program (HEP)  4. Manual Therapy  5. Range of Motion (ROM)  6. Therapeutic Exercise/Strengthening     TREATMENT PLAN:  Effective Dates: 17 TO 18.     Frequency/Duration: 2 times a week for 8 weeks  GOALS: (Goals have been discussed and agreed upon with patient.)  SHORT-TERM FUNCTIONAL GOALS: Time Frame: 2-4 wks  1. Patient will report 25-50% reduction in overall symptoms (MET)  2. Patient will be compliant with HEP and plan of care (MET)  3. Patient will improve talocrural PF by 10 deg (MET)  4. Patient will improve on the FAAM by 3-5 points (MET)  DISCHARGE GOALS: Time Frame: 6-8 wk  1. Patient will report % reduction in overall symptoms in order to be able to have full function with decreased symptoms (ongoing)   2. Patient will be able to perform a single leg heel raise indicating improved achilles and posterior tib strength  (ongoing)   3. Patient will report being able to perform all activities of daily living with minimal pain or compensation (ongoing)  4. Patient will improve on the FAAM by 10-15 points in order to demonstrate improved function with less pain (MET)    Rehabilitation Potential For Stated Goals: Good    Regarding Ivone Blazing therapy, I certify that the treatment plan above will be carried out by a therapist or under their direction. Thank you for this referral,  Quiana Verma PT,DPT    Referring Physician Signature: Reji Bryant MD _____________________________________________________ Date: __________________________________          HISTORY:   History of Present Injury/Illness (Reason for Referral): reports that she fell about a yr ago and tore a ligament causing her to walk poorly. She then hurt her tendon and had surgery to fix it on 6-15.17. She has been doing exercises that were prescribed by Dr. Chyna Wells but still has issues with tightness and weakness. Feels like she can only stand about 30 minutes. She is still struggling with swelling. She has been wearing her ASO brace but feels like the pressure of it is making her worse now.  States she has tingling sensation on occasion  Aggravating factors: standing, walking  Relieving factors: rest non weight bearing    Past Medical History/Comorbidities:   Past Medical History:   Diagnosis Date    Arthritis     GERD (gastroesophageal reflux disease)     daily med    Hypertension     managed with med    Mitral valve prolapse     Gallbladder removed     Hysterectomy       Social History/Living Environment: lives in a single story house  Prior Level of Function/Work/Activity: independent   Current Medications:      Current Outpatient Prescriptions:     atenolol (TENORMIN) 50 mg tablet, Take 50 mg by mouth daily. , Disp: , Rfl:     dilTIAZem (CARDIZEM) 60 mg tablet, Take 60 mg by mouth daily. , Disp: , Rfl:     RABEprazole (ACIPHEX) 20 mg tablet, Take 20 mg by mouth daily (with lunch). Indications: gastroesophageal reflux disease, Disp: , Rfl:     gabapentin (NEURONTIN) 300 mg capsule, Take 300 mg by mouth three (3) times daily. Indications: NEUROPATHIC PAIN, Disp: , Rfl:     simvastatin (ZOCOR) 20 mg tablet, Take 20 mg by mouth nightly. Indications: hypercholesterolemia, Disp: , Rfl:     triamterene-hydroCHLOROthiazide (DYAZIDE) 37.5-25 mg per capsule, Take 1 Cap by mouth daily (with lunch). , Disp: , Rfl:   Date Last Reviewed:  12/1/2017   Number of Personal Factors/Comorbidities that affect the Plan of Care: 1-2: MODERATE COMPLEXITY   EXAMINATION:   (Abbreviations: P-pain, NP- no pain, wnl-within normal limits)  Observation/Orthostatic Postural Assessment:    Relaxed standing posture:  Bilateral femurs are in relative neutral, tibia's are externally rotated     Palpation/tone/tissue texture:    ASIS:n/a  PSIS:n/a  Leg Length: supine:n/a         Long Sitting:      Soft tissue:  · Gastroc/soleus/posterior tibialis: slightly increased tone near the muscle-tendon junction in the L achilles/gastroc/soleus  · Planter fascia: mild tightness on L      Foot Exam Date:  10-10-17    Date  11-17-17     Left Right Left Right   Talocrural Joint: Restricted into PF  wnl WNL WNL   Rear foot: (neutral to relaxed: 4-6                      deg-normal) Mild restriction wnl  Mild restriction into EV WNL   Mid-foot (navicular drop, <7-normal) n/a n/a N/A N/A   Forefoot: (position, mobility) varus Varus, mobile  Varus, mobile Varus, mobile   First ray position/hallux limitus test: PF, hypomobile PF, mobile PF, mobile PF, mobile   Windlass test: n/a n/a N/A N/A   Foot intrinsic strength:  Fair  fair Fair Fair       ROM:   Multisegmental ROM Date:  10-10-17       Flexion -   Extension -   Rotation L -   Rotation R -      Ankle ROM Date:  10-10-17    Date  11-17-17    Right Left Left   DF 10 18 16   PF 30 17 35   Inversion 25 25 23   Eversion 20 25 20           Strength: Foot and ankle Strength Date:  10-10-17    Date  11-17-17    Right Left Left   DF 4 4+ 5   PF 4 3- 3+   Inversion 4 4 5   Eversion 4 4 5                Special Tests: Ankle stability:  Anterior Drawer:-  Talar tilt: -  Kleiger:-    Neurological Screen:   Myotomes: strong in bilateral LE's     Dermatomes: intact in bilateral LE's         Reflexes: n/a         Neural Tension Tests:  Functional Mobility:    · Double leg squat: n/a  · Gait Pattern: will evaluate at a later date  Balance:  Single leg   L:   R:   CLINICAL DECISION MAKING:   Outcome Measure: Tool Used: PT/OT FOOT AND ANKLE ABILITY MEASURE  Score:  Initial: 42/84 (10-10-17) Most Recent: 72/84 (Date: 11-17-17 )   Interpretation of Score: For the \"Activities of Daily Living\", there are 21 questions each scored on a 5 point scale with 0 representing \"Unable to do\" and 4 representing \"No difficulty\". The lower the score, the greater the functional disability. 84/84 represents no disability. Minimal detectable change is 5.7 points. With the addition of the 8 questions in the \"Sports Subscale,\" there are 29 questions, each scored on a 5 point scale with 0 representing \"Unable to do\" and 4 representing \"No difficulty\". The lower the score, the greater the functional disability. 116/116 represents no disability.   Minimal detectable change is 12.3 points. Activities of Daily Living:  Score 84 83-68 67-51 50-34 33-18 17-1 0   Modifier CH CI CJ CK CL CM CN     Activities of Daily Living + Sports Subscale:  Score 116 115-94 93-71 70-47 46-24 23-1 0   Modifier CH CI CJ CK CL CM CN     ? Mobility - Walking and Moving Around:     - CURRENT STATUS: CI - 1%-19% impaired, limited or restricted    - GOAL STATUS: CI - 1%-19% impaired, limited or restricted    - D/C STATUS:  ---------------To be determined---------------    Medical Necessity:   · Patient is expected to demonstrate progress in strength, range of motion and symptom levels to return to full function. Reason for Services/Other Comments:  · Patient continues to require skilled intervention due to ongoing symptoms. TREATMENT:   (In addition to Assessment/Re-Assessment sessions the following treatments were rendered)    Subjective Pre-Treatment Symptoms: Patient reports that she has had minor increases in pain and soreness throughout the week on and off in her ankle. She states that the pain usually comes after a long day of walking and doing more than normal. She states the pain last a few hours then goes away. She states she continues to feel better and is able to do more. Therapeutic Exercise: (18 min) Done in order to improve strength, ROM and understanding of current condition.     Date  11-17-17 Date  11-27-17 Date  12-1-17   Activity/Exercise      Education Discussed HEP, removed inversion exercises Discussed, added inversion and PNF Discussed, corrected heel raises   PF  Long Sitting: Full ROM concentric, eccentric w/ toe curls, 10x  At end range PF, 10x  At end range DF, 10x Discussed Discussed   Inversion - No resistance, only AROM No resistance, only AROM w/ 2-3 sec hold at end range, 10x   Heel Raises 50/50 weight up, weight shift to L, down with both, 10x 50/50 weight up, weight shift to L, down with both, 2x10 50/50 weight up, weight shift to L, down with both, 2x10 PNF - On R side, D1 extension pattern focusing on knee ext and ankle PF in gait pattern, 2x10  Same pattern w/ red TB, 2x10 -  Caused pain in hip   Single Leg Balance - Discussed -   Manual Therapy: (22 min) Done in order to improve joint and soft tissue mobility,reduce muscle guarding, and decrease muscle tone  · Talocrural PF mobilization, grade III-IV  · Talocrural DF mobilization, grade III-IV (NOT DONE TODAY)  · Calcaneal distractions (NOT DONE TODAY)  · STM of gastroc/soleus, posterior Tib  · Tibial/ Fibula AP glides  (NOT DONE TODAY)  · Manual stretch of the gastroc/soleus, w/ some hold-relax  · Talonavicular and 1st Ray PF mobilizations, grades III-IV (NOT DONE TODAY)    Modalities: (0 min) Done in order to reduce swelling and pain  ·     Treatment/Session Assessment: Patient came to PT today with minimal pain in her left ankle. Her ROM has continued to improve and is now equal to that of the right ankle. She has good mobility of the joints in the foot into PF. She has improved strength and does not have any tendon pain with her current exercises. · Pain: Initial:    1-2/10 at rest Post Session:  1/10 at rest ·   Compliance with Program/Exercises: Will assess as treatment progresses. · Recommendations/Intent for next treatment session: \"Next visit will focus on progressing the patients plan of care\".   Future Appointments  Date Time Provider Theo Jimenez   12/11/2017 1:45 PM Eugenie Mantilla, PT, DPT [facility]   1/5/2018 2:45 PM [facility] ROOM 1 [facility]     Total Treatment Duration: 40 min  PT Patient Time In/Time Out  Time In: 1345  Time Out: 1100 Orestes Mosquera PT, DPT

## 2017-12-11 ENCOUNTER — HOSPITAL ENCOUNTER (OUTPATIENT)
Dept: PHYSICAL THERAPY | Age: 82
Discharge: HOME | End: 2017-12-11
Payer: MEDICARE

## 2017-12-11 PROCEDURE — 97110 THERAPEUTIC EXERCISES: CPT

## 2017-12-11 PROCEDURE — 97140 MANUAL THERAPY 1/> REGIONS: CPT

## 2017-12-11 NOTE — PROGRESS NOTES
Lindsay Julian  : 1935  [place] / [place] / Product Type: Medicare /  [address]  at [facility]2 [address]  [phone]   [phone]         OUTPATIENT PHYSICAL THERAPY:Daily Note 2017    ICD-10: Treatment Diagnosis: DIFFICULTY WALKING, NOT ELSEWHERE CLASSIFIED R26.2; POSTERIOR TIBIAL TENDONITIS G16.809   Precautions/Allergies: non reported  Fall Risk Score: 0 (? 5 = High Risk)  MD Orders: evaluate and treat MEDICAL/REFERRING DIAGNOSIS:Left foot pain [M79.672]  DATE OF ONSET: surgery date 6-15-17  REFERRING PHYSICIAN:Mira Maguire MD  RETURN PHYSICIAN APPOINTMENT: did not specify      ASSESSMENT:  Mrs. Abeba Alba has completed 10 sessions of physical therapy, including her initial assessment. Mrs. Abeba Alba has continued to gains range of motion and strength of the ankle, however still has increased pain in her ankle when performing repetitive inversion tasks, especially with resistance. Mrs. Abeba Alba continues to improve with her walking ability and does not have pain in her ankle when walking and performing exercises in weight bearing. She states that she feels she is 35% better since her initial treatment session. Skilled physical therapy is recommended to progress the plan of care in order for the patient to return to full function with minimal symptoms. PROBLEM LIST (Impacting functional limitations):  1. Decreased Strength  2. Decreased ADL/Functional Activities  3. Increased Pain  4. Decreased Activity Tolerance  5. Decreased Flexibility/Joint Mobility  6. Decreased [place] with Home Exercise Program INTERVENTIONS PLANNED:  1. Cold  2. Heat  3. Home Exercise Program (HEP)  4. Manual Therapy  5. Range of Motion (ROM)  6. Therapeutic Exercise/Strengthening     TREATMENT PLAN:  Effective Dates: 17 TO 18.     Frequency/Duration: 2 times a week for 8 weeks  GOALS: (Goals have been discussed and agreed upon with patient.)  SHORT-TERM FUNCTIONAL GOALS: Time Frame: 2-4 wks  1. Patient will report 25-50% reduction in overall symptoms (MET)  2. Patient will be compliant with HEP and plan of care (MET)  3. Patient will improve talocrural PF by 10 deg (MET)  4. Patient will improve on the FAAM by 3-5 points (MET)  DISCHARGE GOALS: Time Frame: 6-8 wk  1. Patient will report % reduction in overall symptoms in order to be able to have full function with decreased symptoms (ongoing)   2. Patient will be able to perform a single leg heel raise indicating improved achilles and posterior tib strength  (ongoing)   3. Patient will report being able to perform all activities of daily living with minimal pain or compensation (ongoing)  4. Patient will improve on the FAAM by 10-15 points in order to demonstrate improved function with less pain (MET)    Rehabilitation Potential For Stated Goals: Good    Regarding Kun Kelly therapy, I certify that the treatment plan above will be carried out by a therapist or under their direction. Thank you for this referral,  Francisco Bull PT,DPT    Referring Physician Signature: Kirby Kohler MD _____________________________________________________ Date: __________________________________          HISTORY:   History of Present Injury/Illness (Reason for Referral): reports that she fell about a yr ago and tore a ligament causing her to walk poorly. She then hurt her tendon and had surgery to fix it on 6-15.17. She has been doing exercises that were prescribed by Dr. Josias Rawls but still has issues with tightness and weakness. Feels like she can only stand about 30 minutes. She is still struggling with swelling. She has been wearing her ASO brace but feels like the pressure of it is making her worse now.  States she has tingling sensation on occasion  Aggravating factors: standing, walking  Relieving factors: rest non weight bearing    Past Medical History/Comorbidities:   Past Medical History:   Diagnosis Date    Arthritis     GERD (gastroesophageal reflux disease)     daily med    Hypertension     managed with med    Mitral valve prolapse     Gallbladder removed     Hysterectomy       Social History/Living Environment: lives in a single story house  Prior Level of Function/Work/Activity: independent   Current Medications:      Current Outpatient Prescriptions:     atenolol (TENORMIN) 50 mg tablet, Take 50 mg by mouth daily. , Disp: , Rfl:     dilTIAZem (CARDIZEM) 60 mg tablet, Take 60 mg by mouth daily. , Disp: , Rfl:     RABEprazole (ACIPHEX) 20 mg tablet, Take 20 mg by mouth daily (with lunch). Indications: gastroesophageal reflux disease, Disp: , Rfl:     gabapentin (NEURONTIN) 300 mg capsule, Take 300 mg by mouth three (3) times daily. Indications: NEUROPATHIC PAIN, Disp: , Rfl:     simvastatin (ZOCOR) 20 mg tablet, Take 20 mg by mouth nightly. Indications: hypercholesterolemia, Disp: , Rfl:     triamterene-hydroCHLOROthiazide (DYAZIDE) 37.5-25 mg per capsule, Take 1 Cap by mouth daily (with lunch). , Disp: , Rfl:   Date Last Reviewed:  12/11/2017   Number of Personal Factors/Comorbidities that affect the Plan of Care: 1-2: MODERATE COMPLEXITY   EXAMINATION:   (Abbreviations: P-pain, NP- no pain, wnl-within normal limits)  Observation/Orthostatic Postural Assessment:    Relaxed standing posture:  Bilateral femurs are in relative neutral, tibia's are externally rotated     Palpation/tone/tissue texture:    ASIS:n/a  PSIS:n/a  Leg Length: supine:n/a         Long Sitting:      Soft tissue:  · Gastroc/soleus/posterior tibialis: slightly increased tone near the muscle-tendon junction in the L achilles/gastroc/soleus  · Planter fascia: mild tightness on L      Foot Exam Date:  10-10-17    Date  11-17-17     Left Right Left Right   Talocrural Joint: Restricted into PF  wnl WNL WNL   Rear foot: (neutral to relaxed: 4-6                      deg-normal) Mild restriction wnl  Mild restriction into EV WNL   Mid-foot (navicular drop, <7-normal) n/a n/a N/A N/A   Forefoot: (position, mobility) varus Varus, mobile  Varus, mobile Varus, mobile   First ray position/hallux limitus test: PF, hypomobile PF, mobile PF, mobile PF, mobile   Windlass test: n/a n/a N/A N/A   Foot intrinsic strength:  Fair  fair Fair Fair       ROM:   Multisegmental ROM Date:  10-10-17       Flexion -   Extension -   Rotation L -   Rotation R -      Ankle ROM Date:  10-10-17    Date  11-17-17    Right Left Left   DF 10 18 16   PF 30 17 35   Inversion 25 25 23   Eversion 20 25 20           Strength: Foot and ankle Strength Date:  10-10-17    Date  11-17-17    Right Left Left   DF 4 4+ 5   PF 4 3- 3+   Inversion 4 4 5   Eversion 4 4 5                Special Tests: Ankle stability:  Anterior Drawer:-  Talar tilt: -  Kleiger:-    Neurological Screen:   Myotomes: strong in bilateral LE's     Dermatomes: intact in bilateral LE's         Reflexes: n/a         Neural Tension Tests:  Functional Mobility:    · Double leg squat: n/a  · Gait Pattern: will evaluate at a later date  Balance:  Single leg   L:   R:   CLINICAL DECISION MAKING:   Outcome Measure: Tool Used: PT/OT FOOT AND ANKLE ABILITY MEASURE  Score:  Initial: 42/84 (10-10-17) Most Recent: 72/84 (Date: 11-17-17 )   Interpretation of Score: For the \"Activities of Daily Living\", there are 21 questions each scored on a 5 point scale with 0 representing \"Unable to do\" and 4 representing \"No difficulty\". The lower the score, the greater the functional disability. 84/84 represents no disability. Minimal detectable change is 5.7 points. With the addition of the 8 questions in the \"Sports Subscale,\" there are 29 questions, each scored on a 5 point scale with 0 representing \"Unable to do\" and 4 representing \"No difficulty\". The lower the score, the greater the functional disability. 116/116 represents no disability.   Minimal detectable change is 12.3 points. Activities of Daily Living:  Score 84 83-68 67-51 50-34 33-18 17-1 0   Modifier CH CI CJ CK CL CM CN     Activities of Daily Living + Sports Subscale:  Score 116 115-94 93-71 70-47 46-24 23-1 0   Modifier CH CI CJ CK CL CM CN     ? Mobility - Walking and Moving Around:     - CURRENT STATUS: CI - 1%-19% impaired, limited or restricted    - GOAL STATUS: CI - 1%-19% impaired, limited or restricted    - D/C STATUS:  ---------------To be determined---------------    Medical Necessity:   · Patient is expected to demonstrate progress in strength, range of motion and symptom levels to return to full function. Reason for Services/Other Comments:  · Patient continues to require skilled intervention due to ongoing symptoms. TREATMENT:   (In addition to Assessment/Re-Assessment sessions the following treatments were rendered)    Subjective Pre-Treatment Symptoms: Patient reports that she had been doing really well but yesterday had moderate cramping in her feet. Thinks it was related to getting into her sons truck. Therapeutic Exercise: (30min) Done in order to improve strength, ROM and understanding of current condition.     Date  11-27-17 Date  12-1-17 Date  12-11-17   Activity/Exercise      Education Discussed, added inversion and PNF Discussed, corrected heel raises Discussed HEP   PF  Discussed Discussed Discussed    Inversion No resistance, only AROM No resistance, only AROM w/ 2-3 sec hold at end range, 10x Manual resistance, 5 sec hold, 10x,   Heel Raises 50/50 weight up, weight shift to L, down with both, 2x10 50/50 weight up, weight shift to L, down with both, 2x10 60/40% with weight towards L, 10x   Single Leg Balance Discussed - · 5 sec, 10x  · Step through: 10x, L LE in stance   Foot intrinsic strengthening    · Big toe press down, 10 sec hold, 5  · Red-band resistance, 10x, 2 sets   Manual Therapy: (10 min) Done in order to improve joint and soft tissue mobility,reduce muscle guarding, and decrease muscle tone  · Talocrural PF mobilization, grade III-IV  · Talocrural DF mobilization, grade III-IV (NOT DONE TODAY)  · Calcaneal distractions (NOT DONE TODAY)      Modalities: (0 min) Done in order to reduce swelling and pain  ·     Treatment/Session Assessment: Patient was able to tolerate a good amount of resistance today without increased pain. She is still unable to do a SL heel raise but is getting stronger. Discussed to follow up for one more visit and discuss whether further therapy is needed at that point. · Pain: Initial:    1-2/10 at rest Post Session:  1/10 at rest ·   Compliance with Program/Exercises: compliant  · Recommendations/Intent for next treatment session: \"Next visit will focus on progressing the patients plan of care\".   Future Appointments  Date Time Provider Theo Jimenez   12/27/2017 1:30 PM Estephanie Solorzano PT, DPT [facility]   1/5/2018 2:45 PM [facility] ROOM 1 [facility]     Total Treatment Duration:40 min  PT Patient Time In/Time Out  Time In: 1350  Time Out: 7533    Kiley Burnett PT, DPT

## 2017-12-27 ENCOUNTER — HOSPITAL ENCOUNTER (OUTPATIENT)
Dept: PHYSICAL THERAPY | Age: 82
Discharge: HOME | End: 2017-12-27
Payer: MEDICARE

## 2017-12-27 PROCEDURE — 97110 THERAPEUTIC EXERCISES: CPT

## 2017-12-27 PROCEDURE — 97140 MANUAL THERAPY 1/> REGIONS: CPT

## 2017-12-27 NOTE — PROGRESS NOTES
Haley   : 1935  [place] / [place] / Product Type: Medicare /  [address] at [facility]2 [address]  [phone]   [phone]         OUTPATIENT PHYSICAL THERAPY:Daily Note 2017    ICD-10: Treatment Diagnosis: DIFFICULTY WALKING, NOT ELSEWHERE CLASSIFIED R26.2; POSTERIOR TIBIAL TENDONITIS H42.721   Precautions/Allergies: non reported  Fall Risk Score: 0 (? 5 = High Risk)  MD Orders: evaluate and treat MEDICAL/REFERRING DIAGNOSIS:Left foot pain [M79.672]  DATE OF ONSET: surgery date 6-15-17  REFERRING PHYSICIAN:Saba Maguire MD  RETURN PHYSICIAN APPOINTMENT: did not specify      ASSESSMENT:  Mrs. April King has completed 10 sessions of physical therapy, including her initial assessment. Mrs. April King has continued to gains range of motion and strength of the ankle, however still has increased pain in her ankle when performing repetitive inversion tasks, especially with resistance. Mrs. April King continues to improve with her walking ability and does not have pain in her ankle when walking and performing exercises in weight bearing. She states that she feels she is 35% better since her initial treatment session. Skilled physical therapy is recommended to progress the plan of care in order for the patient to return to full function with minimal symptoms. PROBLEM LIST (Impacting functional limitations):  1. Decreased Strength  2. Decreased ADL/Functional Activities  3. Increased Pain  4. Decreased Activity Tolerance  5. Decreased Flexibility/Joint Mobility  6. Decreased [place] with Home Exercise Program INTERVENTIONS PLANNED:  1. Cold  2. Heat  3. Home Exercise Program (HEP)  4. Manual Therapy  5. Range of Motion (ROM)  6. Therapeutic Exercise/Strengthening     TREATMENT PLAN:  Effective Dates: 17 TO 18.     Frequency/Duration: 2 times a week for 8 weeks  GOALS: (Goals have been discussed and agreed upon with patient.)  SHORT-TERM FUNCTIONAL GOALS: Time Frame: 2-4 wks  1. Patient will report 25-50% reduction in overall symptoms (MET)  2. Patient will be compliant with HEP and plan of care (MET)  3. Patient will improve talocrural PF by 10 deg (MET)  4. Patient will improve on the FAAM by 3-5 points (MET)  DISCHARGE GOALS: Time Frame: 6-8 wk  1. Patient will report % reduction in overall symptoms in order to be able to have full function with decreased symptoms (ongoing)   2. Patient will be able to perform a single leg heel raise indicating improved achilles and posterior tib strength  (ongoing)   3. Patient will report being able to perform all activities of daily living with minimal pain or compensation (ongoing)  4. Patient will improve on the FAAM by 10-15 points in order to demonstrate improved function with less pain (MET)    Rehabilitation Potential For Stated Goals: Good    Regarding Pattie Livingston therapy, I certify that the treatment plan above will be carried out by a therapist or under their direction. Thank you for this referral,  Immanuel Brush PT,DPT    Referring Physician Signature: Gina Noel MD _____________________________________________________ Date: __________________________________          HISTORY:   History of Present Injury/Illness (Reason for Referral): reports that she fell about a yr ago and tore a ligament causing her to walk poorly. She then hurt her tendon and had surgery to fix it on 6-15.17. She has been doing exercises that were prescribed by Dr. Gema Mayen but still has issues with tightness and weakness. Feels like she can only stand about 30 minutes. She is still struggling with swelling. She has been wearing her ASO brace but feels like the pressure of it is making her worse now.  States she has tingling sensation on occasion  Aggravating factors: standing, walking  Relieving factors: rest non weight bearing    Past Medical History/Comorbidities:   Past Medical History:   Diagnosis Date    Arthritis     GERD (gastroesophageal reflux disease)     daily med    Hypertension     managed with med    Mitral valve prolapse     Gallbladder removed     Hysterectomy       Social History/Living Environment: lives in a single story house  Prior Level of Function/Work/Activity: independent   Current Medications:      Current Outpatient Prescriptions:     atenolol (TENORMIN) 50 mg tablet, Take 50 mg by mouth daily. , Disp: , Rfl:     dilTIAZem (CARDIZEM) 60 mg tablet, Take 60 mg by mouth daily. , Disp: , Rfl:     RABEprazole (ACIPHEX) 20 mg tablet, Take 20 mg by mouth daily (with lunch). Indications: gastroesophageal reflux disease, Disp: , Rfl:     gabapentin (NEURONTIN) 300 mg capsule, Take 300 mg by mouth three (3) times daily. Indications: NEUROPATHIC PAIN, Disp: , Rfl:     simvastatin (ZOCOR) 20 mg tablet, Take 20 mg by mouth nightly. Indications: hypercholesterolemia, Disp: , Rfl:     triamterene-hydroCHLOROthiazide (DYAZIDE) 37.5-25 mg per capsule, Take 1 Cap by mouth daily (with lunch). , Disp: , Rfl:   Date Last Reviewed:  12/27/2017   Number of Personal Factors/Comorbidities that affect the Plan of Care: 1-2: MODERATE COMPLEXITY   EXAMINATION:   (Abbreviations: P-pain, NP- no pain, wnl-within normal limits)  Observation/Orthostatic Postural Assessment:    Relaxed standing posture:  Bilateral femurs are in relative neutral, tibia's are externally rotated     Palpation/tone/tissue texture:    ASIS:n/a  PSIS:n/a  Leg Length: supine:n/a         Long Sitting:      Soft tissue:  · Gastroc/soleus/posterior tibialis: slightly increased tone near the muscle-tendon junction in the L achilles/gastroc/soleus  · Planter fascia: mild tightness on L      Foot Exam Date:  10-10-17    Date  11-17-17     Left Right Left Right   Talocrural Joint: Restricted into PF  wnl WNL WNL   Rear foot: (neutral to relaxed: 4-6                      deg-normal) Mild restriction wnl  Mild restriction into EV WNL   Mid-foot (navicular drop, <7-normal) n/a n/a N/A N/A   Forefoot: (position, mobility) varus Varus, mobile  Varus, mobile Varus, mobile   First ray position/hallux limitus test: PF, hypomobile PF, mobile PF, mobile PF, mobile   Windlass test: n/a n/a N/A N/A   Foot intrinsic strength:  Fair  fair Fair Fair       ROM:   Multisegmental ROM Date:  10-10-17       Flexion -   Extension -   Rotation L -   Rotation R -      Ankle ROM Date:  10-10-17    Date  11-17-17    Right Left Left   DF 10 18 16   PF 30 17 35   Inversion 25 25 23   Eversion 20 25 20           Strength: Foot and ankle Strength Date:  10-10-17    Date  11-17-17    Right Left Left   DF 4 4+ 5   PF 4 3- 3+   Inversion 4 4 5   Eversion 4 4 5                Special Tests: Ankle stability:  Anterior Drawer:-  Talar tilt: -  Kleiger:-    Neurological Screen:   Myotomes: strong in bilateral LE's     Dermatomes: intact in bilateral LE's         Reflexes: n/a         Neural Tension Tests:  Functional Mobility:    · Double leg squat: n/a  · Gait Pattern: will evaluate at a later date  Balance:  Single leg   L:   R:   CLINICAL DECISION MAKING:   Outcome Measure: Tool Used: PT/OT FOOT AND ANKLE ABILITY MEASURE  Score:  Initial: 42/84 (10-10-17) Most Recent: 72/84 (Date: 11-17-17 )   Interpretation of Score: For the \"Activities of Daily Living\", there are 21 questions each scored on a 5 point scale with 0 representing \"Unable to do\" and 4 representing \"No difficulty\". The lower the score, the greater the functional disability. 84/84 represents no disability. Minimal detectable change is 5.7 points. With the addition of the 8 questions in the \"Sports Subscale,\" there are 29 questions, each scored on a 5 point scale with 0 representing \"Unable to do\" and 4 representing \"No difficulty\". The lower the score, the greater the functional disability. 116/116 represents no disability.   Minimal detectable change is 12.3 points. Activities of Daily Living:  Score 84 83-68 67-51 50-34 33-18 17-1 0   Modifier CH CI CJ CK CL CM CN     Activities of Daily Living + Sports Subscale:  Score 116 115-94 93-71 70-47 46-24 23-1 0   Modifier CH CI CJ CK CL CM CN     ? Mobility - Walking and Moving Around:     - CURRENT STATUS: CI - 1%-19% impaired, limited or restricted    - GOAL STATUS: CI - 1%-19% impaired, limited or restricted    - D/C STATUS:  ---------------To be determined---------------    Medical Necessity:   · Patient is expected to demonstrate progress in strength, range of motion and symptom levels to return to full function. Reason for Services/Other Comments:  · Patient continues to require skilled intervention due to ongoing symptoms. TREATMENT:   (In addition to Assessment/Re-Assessment sessions the following treatments were rendered)    Subjective Pre-Treatment Symptoms: Patient reports that she hurt her back reaching for a pan. Since then she has felt worse because she has not been able to do her exercises. Therapeutic Exercise: (30min) Done in order to improve strength, ROM and understanding of current condition.     Date  12-1-17 Date  12-11-17 Date  12-27-17   Activity/Exercise      Education Discussed, corrected heel raises Discussed HEP Discussed HEP  Discussed going to [employer] to try on more supportive shoes   PF  Discussed Discussed     Inversion No resistance, only AROM w/ 2-3 sec hold at end range, 10x Manual resistance, 5 sec hold, 10x, Used other foot as resistance, 5 sec hold, 10x   Heel Raises 50/50 weight up, weight shift to L, down with both, 2x10 60/40% with weight towards L, 10x 60/40% with weight towards L, 10x   Single Leg Balance - · 5 sec, 10x  · Step through: 10x, L LE in stance HEP   Foot intrinsic strengthening   · Big toe press down, 10 sec hold, 5  · Red-band resistance, 10x, 2 sets HEP   Manual Therapy: (15 min) Done in order to improve joint and soft tissue mobility,reduce muscle guarding, and decrease muscle tone  · Talocrural PF mobilization, grade III-IV  · Talocrural DF mobilization, grade III-IV (NOT DONE TODAY)  · Calcaneal distractions (NOT DONE TODAY)      Modalities: (0 min) Done in order to reduce swelling and pain  ·     Treatment/Session Assessment: Mrs. Rosy Shanks had increased talocrural tightness today. This improves with the manual treatment. She also had a hard time activating her posterior tibialis today. She was educated on how to do this isometricaly using her other foot as resistance. She was educated to go to either the [employer] or [employer] to get more supportive shoes. We discussed to continue following up since she is still having trouble being on her feet more than an hour. · Pain: Initial:    0/10 at rest Post Session:  0/10 at rest ·   Compliance with Program/Exercises: compliant  · Recommendations/Intent for next treatment session: \"Next visit will focus on progressing the patients plan of care\".   Future Appointments  Date Time Provider Theo Jimenez   1/5/2018 10:15 AM [facility]   1/5/2018 2:45 PM [facility] ROOM 1 [facility]     Total Treatment Duration:45 min  PT Patient Time In/Time Out  Time In: 1330  Time Out: Mario Wright 29 PT, DPT

## 2018-01-05 ENCOUNTER — HOSPITAL ENCOUNTER (OUTPATIENT)
Dept: PHYSICAL THERAPY | Age: 83
Discharge: HOME | End: 2018-01-05
Payer: MEDICARE

## 2018-01-05 PROCEDURE — G8982 BODY POS GOAL STATUS: HCPCS

## 2018-01-05 PROCEDURE — G8981 BODY POS CURRENT STATUS: HCPCS

## 2018-01-05 PROCEDURE — 97140 MANUAL THERAPY 1/> REGIONS: CPT

## 2018-01-05 PROCEDURE — 97164 PT RE-EVAL EST PLAN CARE: CPT

## 2018-01-05 PROCEDURE — 97110 THERAPEUTIC EXERCISES: CPT

## 2018-01-05 NOTE — PROGRESS NOTES
Medardo Joseph  : 1935  [place] / [place] / Product Type: Medicare /  [address]  at [address] [facility], [address] [place] [address]  [phone]   [phone]    G Codes next session     OUTPATIENT PHYSICAL THERAPY:Daily Note and Re-evaluation 2018    ICD-10: Treatment Diagnosis: DIFFICULTY WALKING, NOT ELSEWHERE CLASSIFIED R26.2; POSTERIOR TIBIAL TENDONITIS L65.844   Precautions/Allergies: non reported  Fall Risk Score: 0 (? 5 = High Risk)  MD Orders: evaluate and treat MEDICAL/REFERRING DIAGNOSIS:Left foot pain [M79.672]  DATE OF ONSET: surgery date 6-15-17  REFERRING PHYSICIAN:Celsa Maguire MD  RETURN PHYSICIAN APPOINTMENT: did not specify      ASSESSMENT:  Mrs. Kemi Dillon has completed 15 sessions since starting physical therapy on 10-10-17. During that time she was making good progress and was nearing discharge but lately suffered a set back after trying to increase the loading on her foot. She reports moderate to severe tightness/pain in her arch if she walks for an extended period of time. Today it was noted that she had moderate tightness in her plantar fascia and flexor digitorum brevis. She still lacks plantarflexion strength but is slowly improving. She was advised to purchase shoes with more support and cushion but she has yet to do so. Skilled physical therapy is recommended to continue progressing her plan of care in order to get her to a point where she can ambulate functional distances with minimal symptoms. PROBLEM LIST (Impacting functional limitations):  1. Decreased Strength  2. Decreased ADL/Functional Activities  3. Increased Pain  4. Decreased Activity Tolerance  5. Decreased Flexibility/Joint Mobility  6. Decreased [place] with Home Exercise Program INTERVENTIONS PLANNED:  1. Cold  2. Heat  3. Home Exercise Program (HEP)  4. Manual Therapy  5. Range of Motion (ROM)  6.  Therapeutic Exercise/Strengthening     TREATMENT PLAN:  Effective Dates:1-5-18 TO 3-5-18  Frequency/Duration: 1 times a week for 8 weeks  GOALS: (Goals have been discussed and agreed upon with patient.)  SHORT-TERM FUNCTIONAL GOALS: Time Frame: 2-4 wks  1. Patient will report 25-50% reduction in overall symptoms (MET)  2. Patient will be compliant with HEP and plan of care (MET)  3. Patient will improve talocrural PF by 10 deg (MET)  4. Patient will improve on the FAAM by 3-5 points (MET)  DISCHARGE GOALS: Time Frame: 6-8 wk  1. Patient will report % reduction in overall symptoms in order to be able to have full function with decreased symptoms (ongoing)   2. Patient will be able to perform a single leg heel raise indicating improved achilles and posterior tib strength  (ongoing)   3. Patient will report being able to perform all activities of daily living with minimal pain or compensation (ongoing)  4. Patient will improve on the FAAM by 10-15 points in order to demonstrate improved function with less pain (MET)    Rehabilitation Potential For Stated Goals: Good    Regarding [facility] therapy, I certify that the treatment plan above will be carried out by a therapist or under their direction. Thank you for this referral,  Anaid Silverman PT,DPT    Referring Physician Signature: Aileen Mirza MD _____________________________________________________ Date: __________________________________          HISTORY:   History of Present Injury/Illness (Reason for Referral): reports that she fell about a yr ago and tore a ligament causing her to walk poorly. She then hurt her tendon and had surgery to fix it on 6-15.17. She has been doing exercises that were prescribed by Dr. Nelli Jhaveri but still has issues with tightness and weakness. Feels like she can only stand about 30 minutes. She is still struggling with swelling. She has been wearing her ASO brace but feels like the pressure of it is making her worse now. States she has tingling sensation on occasion  Aggravating factors: standing, walking  Relieving factors: rest non weight bearing    Past Medical History/Comorbidities:   Past Medical History:   Diagnosis Date    Arthritis     GERD (gastroesophageal reflux disease)     daily med    Hypertension     managed with med    Mitral valve prolapse     Gallbladder removed     Hysterectomy       Social History/Living Environment: lives in a single story house  Prior Level of Function/Work/Activity: independent   Current Medications:      Current Outpatient Prescriptions:     atenolol (TENORMIN) 50 mg tablet, Take 50 mg by mouth daily. , Disp: , Rfl:     dilTIAZem (CARDIZEM) 60 mg tablet, Take 60 mg by mouth daily. , Disp: , Rfl:     RABEprazole (ACIPHEX) 20 mg tablet, Take 20 mg by mouth daily (with lunch). Indications: gastroesophageal reflux disease, Disp: , Rfl:     gabapentin (NEURONTIN) 300 mg capsule, Take 300 mg by mouth three (3) times daily. Indications: NEUROPATHIC PAIN, Disp: , Rfl:     simvastatin (ZOCOR) 20 mg tablet, Take 20 mg by mouth nightly. Indications: hypercholesterolemia, Disp: , Rfl:     triamterene-hydroCHLOROthiazide (DYAZIDE) 37.5-25 mg per capsule, Take 1 Cap by mouth daily (with lunch). , Disp: , Rfl:   Date Last Reviewed:  1/5/2018   EXAMINATION:   (Abbreviations: P-pain, NP- no pain, wnl-within normal limits)  Observation/Orthostatic Postural Assessment:    Relaxed standing posture:  Bilateral femurs are in relative neutral, tibia's are externally rotated     Palpation/tone/tissue texture:    ASIS:n/a  PSIS:n/a  Leg Length: supine:n/a         Long Sitting:      Soft tissue:  · Gastroc/soleus/posterior tibialis: slightly increased tone near the muscle-tendon junction in the L achilles/gastroc/soleus  · Planter fascia/foot intrinsics: moderate tightness noted in her flexor digitorum brevis, flexor hallucis brevis and plantar fascia on left side      Foot Exam Date  1-5-18     Left Right Talocrural Joint: Mild restriction into PF WNL   Rear foot: (neutral to relaxed: 4-6                      deg-normal) Mild restriction into EV WNL   Mid-foot (navicular drop, <7-normal) N/A N/A   Forefoot: (position, mobility) Varus, mobile Varus, mobile   First ray position/hallux limitus test: PF, mobile PF, mobile   Windlass test: N/A N/A   Foot intrinsic strength:  Fair Fair       ROM:   Multisegmental ROM Date:  10-10-17       Flexion -   Extension -   Rotation L -   Rotation R -      Ankle ROM Date:  1-5-18       Right Left   DF 10 18   PF 30 30   Inversion 25 25   Eversion 20 25           Strength: Foot and ankle Strength Date:  10-10-17    Date  1-5-18    Right Left Left   DF 4 4+ 5   PF 4 3- 3+   Inversion 4 4 4-   Eversion 4 4 5                Special Tests: Ankle stability:  Anterior Drawer:-  Talar tilt: -  Kleiger:-    Neurological Screen:   Myotomes: strong in bilateral LE's     Dermatomes: intact in bilateral LE's         Reflexes: n/a         Neural Tension Tests:  Functional Mobility:    · Double leg squat: n/a  · Gait Pattern: ambulates with a slow gait pattern, shortened step length bilateral, minimal pelvic and trunk motion  Balance:  Single leg   L: n/a today  R: n/a today   CLINICAL DECISION MAKING:   Outcome Measure: Tool Used: PT/OT FOOT AND ANKLE ABILITY MEASURE: PATIENT DID NOT FULLY FILL OUT, REPEAT NEXT SESSION  Score:  Initial: 42/84 (10-10-17)  72/84 (Date: 11-17-17 ) Most Recent: -/84  (Date 1-5-18)   Interpretation of Score: For the \"Activities of Daily Living\", there are 21 questions each scored on a 5 point scale with 0 representing \"Unable to do\" and 4 representing \"No difficulty\". The lower the score, the greater the functional disability. 84/84 represents no disability. Minimal detectable change is 5.7 points.   With the addition of the 8 questions in the \"Sports Subscale,\" there are 29 questions, each scored on a 5 point scale with 0 representing \"Unable to do\" and 4 representing \"No difficulty\". The lower the score, the greater the functional disability. 116/116 represents no disability. Minimal detectable change is 12.3 points. Activities of Daily Living:  Score 84 83-68 67-51 50-34 33-18 17-1 0   Modifier CH CI CJ CK CL CM CN     Activities of Daily Living + Sports Subscale:  Score 116 115-94 93-71 70-47 46-24 23-1 0   Modifier CH CI CJ CK CL CM CN     ? Mobility - Walking and Moving Around:     - CURRENT STATUS: CI - 1%-19% impaired, limited or restricted    - GOAL STATUS: CI - 1%-19% impaired, limited or restricted    - D/C STATUS:  ---------------To be determined---------------    Medical Necessity:   · Patient is expected to demonstrate progress in strength, range of motion and symptom levels to return to full function. Reason for Services/Other Comments:  · Patient continues to require skilled intervention due to ongoing symptoms. TREATMENT:   (In addition to Assessment/Re-Assessment sessions the following treatments were rendered)    Subjective Pre-Treatment Symptoms: Patient reports that she hurt her back reaching for a pan. Since then she has felt worse because she has not been able to do her exercises. Therapeutic Exercise: (15min) Done in order to improve strength, ROM and understanding of current condition.     Date  12-11-17 Date  12-27-17 Date  1-5-18   Activity/Exercise      Education Discussed HEP Discussed HEP  Discussed going to new balance to try on more supportive shoes · Discussed HEP  · Discussed self arch soft tissue mobilization using a ball   PF  Discussed   Manual resistance: 10 sec, 10x   Inversion Manual resistance, 5 sec hold, 10x, Used other foot as resistance, 5 sec hold, 10x -   Heel Raises 60/40% with weight towards L, 10x 60/40% with weight towards L, 10x Not done today in order to avoid increased overloading    Single Leg Balance · 5 sec, 10x  · Step through: 10x, L LE in stance HEP HEP   Foot intrinsic strengthening · Big toe press down, 10 sec hold, 5  · Red-band resistance, 10x, 2 sets HEP HEP   Manual Therapy: (15 min) Done in order to improve joint and soft tissue mobility,reduce muscle guarding, and decrease muscle tone  · Talocrural PF mobilization, grade III-IV  · Soft tissue mobilization of the plantar fascia, flexor digitorum brevis and flexor hallucis brevis. · Talocrural DF mobilization, grade III-IV (NOT DONE TODAY)  · Calcaneal distractions (NOT DONE TODAY)      Modalities: (0 min) Done in order to reduce swelling and pain  ·     Treatment/Session Assessment: Mrs. Saida Harding had moderate foot intrinsic and plantar fascia tightness. This improved after the session. Discussed to perform self mobilizations at home. · Pain: Initial:    0/10 at rest (reports increased pain with prolonged ambulation  Post Session:  0/10 at rest ·   Compliance with Program/Exercises: compliant  · Recommendations/Intent for next treatment session: \"Next visit will focus on progressing the patients plan of care\".   Future Appointments  Date Time Provider Theo Jimenez   1/26/2018 1:00 PM Héctor Avery PT, DPT [facility]   3/14/2018 11:00 AM [facility] ROOM 1 [facility]     Total Treatment Duration:45 min  PT Patient Time In/Time Out  Time In: 1020  Time Out: 106 Nieves Gentile PT, DPT

## 2018-01-26 ENCOUNTER — HOSPITAL ENCOUNTER (OUTPATIENT)
Dept: PHYSICAL THERAPY | Age: 83
Discharge: HOME | End: 2018-01-26
Payer: MEDICARE

## 2018-01-26 PROCEDURE — G8981 BODY POS CURRENT STATUS: HCPCS

## 2018-01-26 PROCEDURE — 97140 MANUAL THERAPY 1/> REGIONS: CPT

## 2018-01-26 PROCEDURE — G8982 BODY POS GOAL STATUS: HCPCS

## 2018-01-26 PROCEDURE — 97110 THERAPEUTIC EXERCISES: CPT

## 2018-01-26 NOTE — PROGRESS NOTES
Duyen Eason  : 1935  [place] / Product Type: Medicare /  [address]  at [address] [place] [address]  [phone]   [phone]         OUTPATIENT PHYSICAL THERAPY:Daily Note 2018    ICD-10: Treatment Diagnosis: DIFFICULTY WALKING, NOT ELSEWHERE CLASSIFIED R26.2; POSTERIOR TIBIAL TENDONITIS N06.926   Precautions/Allergies: non reported  Fall Risk Score: 0 (? 5 = High Risk)  MD Orders: evaluate and treat MEDICAL/REFERRING DIAGNOSIS:Left foot pain [M79.672]  DATE OF ONSET: surgery date 6-15-17  REFERRING PHYSICIAN:Donald Maguire MD  RETURN PHYSICIAN APPOINTMENT: did not specify      ASSESSMENT:  Mrs. James Wiley has completed 15 sessions since starting physical therapy on 10-10-17. During that time she was making good progress and was nearing discharge but lately suffered a set back after trying to increase the loading on her foot. She reports moderate to severe tightness/pain in her arch if she walks for an extended period of time. Today it was noted that she had moderate tightness in her plantar fascia and flexor digitorum brevis. She still lacks plantarflexion strength but is slowly improving. She was advised to purchase shoes with more support and cushion but she has yet to do so. Skilled physical therapy is recommended to continue progressing her plan of care in order to get her to a point where she can ambulate functional distances with minimal symptoms. PROBLEM LIST (Impacting functional limitations):  1. Decreased Strength  2. Decreased ADL/Functional Activities  3. Increased Pain  4. Decreased Activity Tolerance  5. Decreased Flexibility/Joint Mobility  6. Decreased [place] with Home Exercise Program INTERVENTIONS PLANNED:  1. Cold  2. Heat  3. Home Exercise Program (HEP)  4. Manual Therapy  5. Range of Motion (ROM)  6.  Therapeutic Exercise/Strengthening     TREATMENT PLAN:  Effective Dates:1-5-18 TO 3-5-18  Frequency/Duration: 1 times a week for 8 weeks  GOALS: (Goals have been discussed and agreed upon with patient.)  SHORT-TERM FUNCTIONAL GOALS: Time Frame: 2-4 wks  1. Patient will report 25-50% reduction in overall symptoms (MET)  2. Patient will be compliant with HEP and plan of care (MET)  3. Patient will improve talocrural PF by 10 deg (MET)  4. Patient will improve on the FAAM by 3-5 points (MET)  DISCHARGE GOALS: Time Frame: 6-8 wk  1. Patient will report % reduction in overall symptoms in order to be able to have full function with decreased symptoms (ongoing)   2. Patient will be able to perform a single leg heel raise indicating improved achilles and posterior tib strength  (ongoing)   3. Patient will report being able to perform all activities of daily living with minimal pain or compensation (ongoing)  4. Patient will improve on the FAAM by 10-15 points in order to demonstrate improved function with less pain (MET)    Rehabilitation Potential For Stated Goals: Good    Regarding [facility] therapy, I certify that the treatment plan above will be carried out by a therapist or under their direction. Thank you for this referral,  Linda Machado PT,DPT    Referring Physician Signature: Bradford Manuel MD _____________________________________________________ Date: __________________________________          HISTORY:   History of Present Injury/Illness (Reason for Referral): reports that she fell about a yr ago and tore a ligament causing her to walk poorly. She then hurt her tendon and had surgery to fix it on 6-15.17. She has been doing exercises that were prescribed by Dr. Ambar Colon but still has issues with tightness and weakness. Feels like she can only stand about 30 minutes. She is still struggling with swelling. She has been wearing her ASO brace but feels like the pressure of it is making her worse now.  States she has tingling sensation on occasion  Aggravating factors: standing, walking  Relieving factors: rest non weight bearing    Past Medical History/Comorbidities:   Past Medical History:   Diagnosis Date    Arthritis     GERD (gastroesophageal reflux disease)     daily med    Hypertension     managed with med    Mitral valve prolapse     Gallbladder removed     Hysterectomy       Social History/Living Environment: lives in a single story house  Prior Level of Function/Work/Activity: independent   Current Medications:      Current Outpatient Prescriptions:     atenolol (TENORMIN) 50 mg tablet, Take 50 mg by mouth daily. , Disp: , Rfl:     dilTIAZem (CARDIZEM) 60 mg tablet, Take 60 mg by mouth daily. , Disp: , Rfl:     RABEprazole (ACIPHEX) 20 mg tablet, Take 20 mg by mouth daily (with lunch). Indications: gastroesophageal reflux disease, Disp: , Rfl:     gabapentin (NEURONTIN) 300 mg capsule, Take 300 mg by mouth three (3) times daily. Indications: NEUROPATHIC PAIN, Disp: , Rfl:     simvastatin (ZOCOR) 20 mg tablet, Take 20 mg by mouth nightly. Indications: hypercholesterolemia, Disp: , Rfl:     triamterene-hydroCHLOROthiazide (DYAZIDE) 37.5-25 mg per capsule, Take 1 Cap by mouth daily (with lunch). , Disp: , Rfl:   Date Last Reviewed:  1/26/2018   EXAMINATION:   (Abbreviations: P-pain, NP- no pain, wnl-within normal limits)  Observation/Orthostatic Postural Assessment:    Relaxed standing posture:  Bilateral femurs are in relative neutral, tibia's are externally rotated     Palpation/tone/tissue texture:    ASIS:n/a  PSIS:n/a  Leg Length: supine:n/a         Long Sitting:      Soft tissue:  · Gastroc/soleus/posterior tibialis: slightly increased tone near the muscle-tendon junction in the L achilles/gastroc/soleus  · Planter fascia/foot intrinsics: moderate tightness noted in her flexor digitorum brevis, flexor hallucis brevis and plantar fascia on left side      Foot Exam Date  1-5-18     Left Right   Talocrural Joint: Mild restriction into PF WNL   Rear foot: (neutral to relaxed: 4-6                      deg-normal) Mild restriction into EV WNL   Mid-foot (navicular drop, <7-normal) N/A N/A   Forefoot: (position, mobility) Varus, mobile Varus, mobile   First ray position/hallux limitus test: PF, mobile PF, mobile   Windlass test: N/A N/A   Foot intrinsic strength:  Fair Fair       ROM:   Multisegmental ROM Date:  10-10-17       Flexion -   Extension -   Rotation L -   Rotation R -      Ankle ROM Date:  1-5-18       Right Left   DF 10 18   PF 30 30   Inversion 25 25   Eversion 20 25           Strength: Foot and ankle Strength Date:  10-10-17    Date  1-5-18    Right Left Left   DF 4 4+ 5   PF 4 3- 3+   Inversion 4 4 4-   Eversion 4 4 5                Special Tests: Ankle stability:  Anterior Drawer:-  Talar tilt: -  Kleiger:-    Neurological Screen:   Myotomes: strong in bilateral LE's     Dermatomes: intact in bilateral LE's         Reflexes: n/a         Neural Tension Tests:  Functional Mobility:    · Double leg squat: n/a  · Gait Pattern: ambulates with a slow gait pattern, shortened step length bilateral, minimal pelvic and trunk motion  Balance:  Single leg   L: n/a today  R: n/a today   CLINICAL DECISION MAKING:   Outcome Measure: Tool Used: PT/OT FOOT AND ANKLE ABILITY MEASURE:   Score:  Initial: 42/84 (10-10-17)  72/84 (Date: 11-17-17 ) Most Recent: 64/84  (Date 1-26-18)   Interpretation of Score: For the \"Activities of Daily Living\", there are 21 questions each scored on a 5 point scale with 0 representing \"Unable to do\" and 4 representing \"No difficulty\". The lower the score, the greater the functional disability. 84/84 represents no disability. Minimal detectable change is 5.7 points. With the addition of the 8 questions in the \"Sports Subscale,\" there are 29 questions, each scored on a 5 point scale with 0 representing \"Unable to do\" and 4 representing \"No difficulty\". The lower the score, the greater the functional disability.  116/116 represents no disability. Minimal detectable change is 12.3 points. Activities of Daily Living:  Score 84 83-68 67-51 50-34 33-18 17-1 0   Modifier CH CI CJ CK CL CM CN     Activities of Daily Living + Sports Subscale:  Score 116 115-94 93-71 70-47 46-24 23-1 0   Modifier CH CI CJ CK CL CM CN     ? Mobility - Walking and Moving Around:     - CURRENT STATUS: CI - 1%-19% impaired, limited or restricted    - GOAL STATUS: CI - 1%-19% impaired, limited or restricted    - D/C STATUS:  ---------------To be determined---------------  ? Changing and Maintaining Body Position:    [id number] - CURRENT STATUS: CJ - 20%-39% impaired, limited or restricted    - GOAL STATUS: CI - 1%-19% impaired, limited or restricted    - D/C STATUS:  ---------------To be determined---------------    Medical Necessity:   · Patient is expected to demonstrate progress in strength, range of motion and symptom levels to return to full function. Reason for Services/Other Comments:  · Patient continues to require skilled intervention due to ongoing symptoms. TREATMENT:   (In addition to Assessment/Re-Assessment sessions the following treatments were rendered)    Subjective Pre-Treatment Symptoms: Patient reports that she hurt her back reaching for a pan. Since then she has felt worse because she has not been able to do her exercises. Therapeutic Exercise: (20min) Done in order to improve strength, ROM and understanding of current condition.     Date  12-27-17 Date  1-5-18 Date  1-26-18   Activity/Exercise      Education Discussed HEP  Discussed going to new balance to try on more supportive shoes · Discussed HEP  · Discussed self arch soft tissue mobilization using a ball · Discussed HEP   PF   Manual resistance: 10 sec, 10x -   Inversion Used other foot as resistance, 5 sec hold, 10x - Manual resistance, 5 sec hold, 10x   Heel Raises 60/40% with weight towards L, 10x Not done today in order to avoid increased overloading  -   Single Leg Balance HEP HEP HEP   Foot intrinsic strengthening  HEP HEP Yellow band: 10x, 2 sets  Red band: 10x,    Foot intrinsic stretch   In sitting,60 sec     Manual Therapy: (25 min) Done in order to improve joint and soft tissue mobility,reduce muscle guarding, and decrease muscle tone  · Soft tissue mobilization of the plantar fascia, flexor digitorum brevis, abductor digiti minimi  and flexor hallucis brevis. · Talocrural PF mobilization, grade III-IV      Modalities: (0 min) Done in order to reduce swelling and pain  ·     Treatment/Session Assessment: Mrs. Mary Quigley still has moderate tightness in her intrinsics especially in flexor digitorum brevis and quadratus plantae. Also has wkns and decreased endurance in those muscles. She was given an updated HEP. · Pain: Initial:   4/10 in the foot Post Session:  0/10 at rest ·   Compliance with Program/Exercises: compliant  · Recommendations/Intent for next treatment session: \"Next visit will focus on progressing the patients plan of care\".   Future Appointments  Date Time Provider Theo Jimenez   1/31/2018 1:30 PM Tez Miller, PT, DPT [facility]   2/12/2018 1:00 PM Tez Miller, PT, DPT [facility]   3/14/2018 11:00 AM [facility] ROOM 1 [facility]     Total Treatment Duration:45 min  PT Patient Time In/Time Out  Time In: 1300  Time Out: [address] PT, DPT

## 2018-01-31 ENCOUNTER — HOSPITAL ENCOUNTER (OUTPATIENT)
Dept: PHYSICAL THERAPY | Age: 83
Discharge: HOME | End: 2018-01-31
Payer: MEDICARE

## 2018-01-31 PROCEDURE — 97110 THERAPEUTIC EXERCISES: CPT

## 2018-01-31 PROCEDURE — 97140 MANUAL THERAPY 1/> REGIONS: CPT

## 2018-01-31 NOTE — PROGRESS NOTES
Lisa Valdez  : 1935  [place] / [place] / Product Type: Medicare /  [address]  at [address] [place] [address]  [phone]   [phone]         OUTPATIENT PHYSICAL THERAPY:Daily Note 2018    ICD-10: Treatment Diagnosis: DIFFICULTY WALKING, NOT ELSEWHERE CLASSIFIED R26.2; POSTERIOR TIBIAL TENDONITIS S44.599   Precautions/Allergies: non reported  Fall Risk Score: 0 (? 5 = High Risk)  MD Orders: evaluate and treat MEDICAL/REFERRING DIAGNOSIS:Left foot pain [M79.672]  DATE OF ONSET: surgery date 6-15-17  REFERRING PHYSICIAN:Laith Maguire MD  RETURN PHYSICIAN APPOINTMENT: did not specify      ASSESSMENT:  Mrs. Delia Coleman has completed 15 sessions since starting physical therapy on 10-10-17. During that time she was making good progress and was nearing discharge but lately suffered a set back after trying to increase the loading on her foot. She reports moderate to severe tightness/pain in her arch if she walks for an extended period of time. Today it was noted that she had moderate tightness in her plantar fascia and flexor digitorum brevis. She still lacks plantarflexion strength but is slowly improving. She was advised to purchase shoes with more support and cushion but she has yet to do so. Skilled physical therapy is recommended to continue progressing her plan of care in order to get her to a point where she can ambulate functional distances with minimal symptoms. PROBLEM LIST (Impacting functional limitations):  1. Decreased Strength  2. Decreased ADL/Functional Activities  3. Increased Pain  4. Decreased Activity Tolerance  5. Decreased Flexibility/Joint Mobility  6. Decreased [place] with Home Exercise Program INTERVENTIONS PLANNED:  1. Cold  2. Heat  3. Home Exercise Program (HEP)  4. Manual Therapy  5. Range of Motion (ROM)  6.  Therapeutic Exercise/Strengthening     TREATMENT PLAN:  Effective Dates:1-5-18 TO 3-5-18  Frequency/Duration: 1 times a week for 8 weeks  GOALS: (Goals have been discussed and agreed upon with patient.)  SHORT-TERM FUNCTIONAL GOALS: Time Frame: 2-4 wks  1. Patient will report 25-50% reduction in overall symptoms (MET)  2. Patient will be compliant with HEP and plan of care (MET)  3. Patient will improve talocrural PF by 10 deg (MET)  4. Patient will improve on the FAAM by 3-5 points (MET)  DISCHARGE GOALS: Time Frame: 6-8 wk  1. Patient will report % reduction in overall symptoms in order to be able to have full function with decreased symptoms (ongoing)   2. Patient will be able to perform a single leg heel raise indicating improved achilles and posterior tib strength  (ongoing)   3. Patient will report being able to perform all activities of daily living with minimal pain or compensation (ongoing)  4. Patient will improve on the FAAM by 10-15 points in order to demonstrate improved function with less pain (MET)    Rehabilitation Potential For Stated Goals: Good    Regarding Ruf[facility] Lopez therapy, I certify that the treatment plan above will be carried out by a therapist or under their direction. Thank you for this referral,  Reg Mcdonald PT,DPT    Referring Physician Signature: Gage Rayo MD _____________________________________________________ Date: __________________________________          HISTORY:   History of Present Injury/Illness (Reason for Referral): reports that she fell about a yr ago and tore a ligament causing her to walk poorly. She then hurt her tendon and had surgery to fix it on 6-15.17. She has been doing exercises that were prescribed by Dr. Cathy Briseno but still has issues with tightness and weakness. Feels like she can only stand about 30 minutes. She is still struggling with swelling. She has been wearing her ASO brace but feels like the pressure of it is making her worse now.  States she has tingling sensation on occasion  Aggravating factors: standing, walking  Relieving factors: rest non weight bearing    Past Medical History/Comorbidities:   Past Medical History:   Diagnosis Date    Arthritis     GERD (gastroesophageal reflux disease)     daily med    Hypertension     managed with med    Mitral valve prolapse     Gallbladder removed     Hysterectomy       Social History/Living Environment: lives in a single story house  Prior Level of Function/Work/Activity: independent   Current Medications:      Current Outpatient Prescriptions:     atenolol (TENORMIN) 50 mg tablet, Take 50 mg by mouth daily. , Disp: , Rfl:     dilTIAZem (CARDIZEM) 60 mg tablet, Take 60 mg by mouth daily. , Disp: , Rfl:     RABEprazole (ACIPHEX) 20 mg tablet, Take 20 mg by mouth daily (with lunch). Indications: gastroesophageal reflux disease, Disp: , Rfl:     gabapentin (NEURONTIN) 300 mg capsule, Take 300 mg by mouth three (3) times daily. Indications: NEUROPATHIC PAIN, Disp: , Rfl:     simvastatin (ZOCOR) 20 mg tablet, Take 20 mg by mouth nightly. Indications: hypercholesterolemia, Disp: , Rfl:     triamterene-hydroCHLOROthiazide (DYAZIDE) 37.5-25 mg per capsule, Take 1 Cap by mouth daily (with lunch). , Disp: , Rfl:   Date Last Reviewed:  1/31/2018   EXAMINATION:   (Abbreviations: P-pain, NP- no pain, wnl-within normal limits)  Observation/Orthostatic Postural Assessment:    Relaxed standing posture:  Bilateral femurs are in relative neutral, tibia's are externally rotated     Palpation/tone/tissue texture:    ASIS:n/a  PSIS:n/a  Leg Length: supine:n/a         Long Sitting:      Soft tissue:  · Gastroc/soleus/posterior tibialis: slightly increased tone near the muscle-tendon junction in the L achilles/gastroc/soleus  · Planter fascia/foot intrinsics: moderate tightness noted in her flexor digitorum brevis, flexor hallucis brevis and plantar fascia on left side      Foot Exam Date  1-5-18     Left Right   Talocrural Joint: Mild restriction into PF WNL   Rear foot: (neutral to relaxed: 4-6                      deg-normal) Mild restriction into EV WNL   Mid-foot (navicular drop, <7-normal) N/A N/A   Forefoot: (position, mobility) Varus, mobile Varus, mobile   First ray position/hallux limitus test: PF, mobile PF, mobile   Windlass test: N/A N/A   Foot intrinsic strength:  Fair Fair       ROM:   Multisegmental ROM Date:  10-10-17       Flexion -   Extension -   Rotation L -   Rotation R -      Ankle ROM Date:  1-5-18       Right Left   DF 10 18   PF 30 30   Inversion 25 25   Eversion 20 25           Strength: Foot and ankle Strength Date:  10-10-17    Date  1-5-18    Right Left Left   DF 4 4+ 5   PF 4 3- 3+   Inversion 4 4 4-   Eversion 4 4 5                Special Tests: Ankle stability:  Anterior Drawer:-  Talar tilt: -  Kleiger:-    Neurological Screen:   Myotomes: strong in bilateral LE's     Dermatomes: intact in bilateral LE's         Reflexes: n/a         Neural Tension Tests:  Functional Mobility:    · Double leg squat: n/a  · Gait Pattern: ambulates with a slow gait pattern, shortened step length bilateral, minimal pelvic and trunk motion  Balance:  Single leg   L: n/a today  R: n/a today   CLINICAL DECISION MAKING:   Outcome Measure: Tool Used: PT/OT FOOT AND ANKLE ABILITY MEASURE:   Score:  Initial: 42/84 (10-10-17)  72/84 (Date: 11-17-17 ) Most Recent: 64/84  (Date 1-26-18)   Interpretation of Score: For the \"Activities of Daily Living\", there are 21 questions each scored on a 5 point scale with 0 representing \"Unable to do\" and 4 representing \"No difficulty\". The lower the score, the greater the functional disability. 84/84 represents no disability. Minimal detectable change is 5.7 points. With the addition of the 8 questions in the \"Sports Subscale,\" there are 29 questions, each scored on a 5 point scale with 0 representing \"Unable to do\" and 4 representing \"No difficulty\". The lower the score, the greater the functional disability.  116/116 represents no disability. Minimal detectable change is 12.3 points. Activities of Daily Living:  Score 84 83-68 67-51 50-34 33-18 17-1 0   Modifier CH CI CJ CK CL CM CN     Activities of Daily Living + Sports Subscale:  Score 116 115-94 93-71 70-47 46-24 23-1 0   Modifier CH CI CJ CK CL CM CN     ? Mobility - Walking and Moving Around:     - CURRENT STATUS: CI - 1%-19% impaired, limited or restricted    - GOAL STATUS: CI - 1%-19% impaired, limited or restricted    - D/C STATUS:  ---------------To be determined---------------  ? Changing and Maintaining Body Position:    [id number] - CURRENT STATUS: CJ - 20%-39% impaired, limited or restricted    - GOAL STATUS: CI - 1%-19% impaired, limited or restricted    - D/C STATUS:  ---------------To be determined---------------    Medical Necessity:   · Patient is expected to demonstrate progress in strength, range of motion and symptom levels to return to full function. Reason for Services/Other Comments:  · Patient continues to require skilled intervention due to ongoing symptoms. TREATMENT:   (In addition to Assessment/Re-Assessment sessions the following treatments were rendered)    Subjective Pre-Treatment Symptoms: Patient reports that she is feeling better after last session. Continues to do the exercises. Therapeutic Exercise: (15min) Done in order to improve strength, ROM and understanding of current condition.     Date  1-5-18 Date  1-26-18 Date  1-31-18   Activity/Exercise      Education · Discussed HEP  · Discussed self arch soft tissue mobilization using a ball · Discussed HEP · Discussed HEP   Inversion - Manual resistance, 5 sec hold, 10x    Heel Raises Not done today in order to avoid increased overloading  - 10x, 2 sets   Single Leg Balance HEP HEP HEP   Foot intrinsic strengthening  HEP Yellow band: 10x, 2 sets  Red band: 10x,  Yellow-band, 10x, 3 sets   Foot intrinsic stretch  In sitting,60 sec In sitting, 60 sec     Manual Therapy: (25 min) Done in order to improve joint and soft tissue mobility,reduce muscle guarding, and decrease muscle tone  · Soft tissue mobilization of the plantar fascia, flexor digitorum brevis, abductor digiti minimi  and flexor hallucis brevis. · Talocrural PF mobilization, grade III-IV      Modalities: (0 min) Done in order to reduce swelling and pain  ·     Treatment/Session Assessment: Mrs. Wolfe tolerated the session relatively well. She still had moderate tone and tightness in her flexor digitorum brevis but is showing signs of improvement. Continues to work on strength and mobility of the plantar soft tissue. · Pain: Initial:   0/10 in the foot Post Session:  0/10 at rest ·   Compliance with Program/Exercises: compliant  · Recommendations/Intent for next treatment session: \"Next visit will focus on progressing the patients plan of care\".   Future Appointments  Date Time Provider Theo Jimenez   2/12/2018 1:00 PM Ml Herrera, PT, DPT [facility]   3/14/2018 11:00 AM [facility] ROOM 1 [facility] DERRICK     Total Treatment Duration:40 min  PT Patient Time In/Time Out  Time In: 1130  Time Out: 43 [facility] PT, DPT

## 2018-02-21 ENCOUNTER — HOSPITAL ENCOUNTER (OUTPATIENT)
Dept: PHYSICAL THERAPY | Age: 83
Discharge: HOME | End: 2018-02-21
Payer: MEDICARE

## 2018-02-21 PROCEDURE — G8983 BODY POS D/C STATUS: HCPCS

## 2018-02-21 PROCEDURE — 97110 THERAPEUTIC EXERCISES: CPT

## 2018-02-21 PROCEDURE — G8982 BODY POS GOAL STATUS: HCPCS

## 2018-02-21 PROCEDURE — 97140 MANUAL THERAPY 1/> REGIONS: CPT

## 2018-02-21 NOTE — PROGRESS NOTES
Gama Maldonado  : 1935  [place] / Product Type: Medicare /  [address]  at [address] [place] [address]  [phone]   [phone]         OUTPATIENT PHYSICAL THERAPY:Daily Note and Discharge 2018    ICD-10: Treatment Diagnosis: DIFFICULTY WALKING, NOT ELSEWHERE CLASSIFIED R26.2; POSTERIOR TIBIAL TENDONITIS G34.756   Precautions/Allergies: non reported  Fall Risk Score: 0 (? 5 = High Risk)  MD Orders: evaluate and treat MEDICAL/REFERRING DIAGNOSIS:Left foot pain [M79.672]  DATE OF ONSET: surgery date 6-15-17  REFERRING PHYSICIAN:Antonio Maguire MD  RETURN PHYSICIAN APPOINTMENT: did not specify      ASSESSMENT:  Mrs. Clyde Gan has completed 18 sessions since starting physical therapy on 10-10-17. During that time she made good progress overall but needs to continue working on her foot mobility and strength in order to continue improving her weight bearing tolerance. At this time she feels comfortable progressing her own plan of care; therefore, she will be discharged until further notice. Thank you. GOALS: (Goals have been discussed and agreed upon with patient.)  SHORT-TERM FUNCTIONAL GOALS: Time Frame: 2-4 wks  1. Patient will report 25-50% reduction in overall symptoms (MET)  2. Patient will be compliant with HEP and plan of care (MET)  3. Patient will improve talocrural PF by 10 deg (MET)  4. Patient will improve on the FAAM by 3-5 points (MET)  DISCHARGE GOALS: Time Frame: 6-8 wk  1. Patient will report % reduction in overall symptoms in order to be able to have full function with decreased symptoms (not consistent)   2. Patient will be able to perform a single leg heel raise indicating improved achilles and posterior tib strength  NOT MET   3. Patient will report being able to perform all activities of daily living with minimal pain or compensation (Not consistent )   4.   Patient will improve on the FAAM by 10-15 points in order to demonstrate improved function with less pain (MET)            HISTORY:   History of Present Injury/Illness (Reason for Referral): reports that she fell about a yr ago and tore a ligament causing her to walk poorly. She then hurt her tendon and had surgery to fix it on 6-15.17. She has been doing exercises that were prescribed by Dr. Declan Nance but still has issues with tightness and weakness. Feels like she can only stand about 30 minutes. She is still struggling with swelling. She has been wearing her ASO brace but feels like the pressure of it is making her worse now. States she has tingling sensation on occasion  Aggravating factors: standing, walking  Relieving factors: rest non weight bearing    Past Medical History/Comorbidities:   Past Medical History:   Diagnosis Date    Arthritis     GERD (gastroesophageal reflux disease)     daily med    Hypertension     managed with med    Mitral valve prolapse     Gallbladder removed     Hysterectomy       Social History/Living Environment: lives in a single story house  Prior Level of Function/Work/Activity: independent   Current Medications:      Current Outpatient Prescriptions:     atenolol (TENORMIN) 50 mg tablet, Take 50 mg by mouth daily. , Disp: , Rfl:     dilTIAZem (CARDIZEM) 60 mg tablet, Take 60 mg by mouth daily. , Disp: , Rfl:     RABEprazole (ACIPHEX) 20 mg tablet, Take 20 mg by mouth daily (with lunch). Indications: gastroesophageal reflux disease, Disp: , Rfl:     gabapentin (NEURONTIN) 300 mg capsule, Take 300 mg by mouth three (3) times daily. Indications: NEUROPATHIC PAIN, Disp: , Rfl:     simvastatin (ZOCOR) 20 mg tablet, Take 20 mg by mouth nightly. Indications: hypercholesterolemia, Disp: , Rfl:     triamterene-hydroCHLOROthiazide (DYAZIDE) 37.5-25 mg per capsule, Take 1 Cap by mouth daily (with lunch). , Disp: , Rfl:   Date Last Reviewed:  2/24/2018   EXAMINATION:   (Abbreviations: P-pain, NP- no pain, wnl-within normal limits)  Observation/Orthostatic Postural Assessment:    Relaxed standing posture:  Bilateral femurs are in relative neutral, tibia's are externally rotated     Palpation/tone/tissue texture:    ASIS:n/a  PSIS:n/a  Leg Length: supine:n/a         Long Sitting:      Soft tissue:  · Gastroc/soleus/posterior tibialis: slightly increased tone near the muscle-tendon junction in the L achilles/gastroc/soleus  · Planter fascia/foot intrinsics: mild tightness noted in her flexor digitorum brevis, flexor hallucis brevis and plantar fascia on left side      Foot Exam Date  2-21-18     Left Right   Talocrural Joint: Mild restriction into PF WNL   Rear foot: (neutral to relaxed: 4-6                      deg-normal) Mild restriction into EV WNL   Mid-foot (navicular drop, <7-normal) N/A N/A   Forefoot: (position, mobility) Varus, mobile Varus, mobile   First ray position/hallux limitus test: PF, mobile PF, mobile   Windlass test: N/A N/A   Foot intrinsic strength:  Fair+ Fair+       ROM:      Ankle ROM Date:  2-21-18       Right Left   DF 10 20   PF 30 25   Inversion 25 20   Eversion 20 20           Strength: Foot and ankle Strength Date:  2-21-18     Right Left   DF 4 5   PF 4 3+   Inversion 4 4-   Eversion 4 5                Special Tests: -  Neurological Screen:   Myotomes: strong in bilateral LE's     Dermatomes: intact in bilateral LE's         Reflexes: n/a         Neural Tension Tests:  Functional Mobility:    · Double leg squat: n/a  · Gait Pattern: ambulates with a slow gait pattern, shortened step length bilateral, minimal pelvic and trunk motion  Balance:  Single leg   L: n/a today  R: n/a today   CLINICAL DECISION MAKING:   Outcome Measure: Tool Used: PT/OT FOOT AND ANKLE ABILITY MEASURE:   Score:  Initial: 42/84 (10-10-17)  72/84 (Date: 11-17-17 ) 64/84  (Date 1-26-18) Most Recent: 66/84 (2-21-18)   Interpretation of Score:  For the \"Activities of Daily Living\", there are 21 questions each scored on a 5 point scale with 0 representing \"Unable to do\" and 4 representing \"No difficulty\". The lower the score, the greater the functional disability. 84/84 represents no disability. Minimal detectable change is 5.7 points. With the addition of the 8 questions in the \"Sports Subscale,\" there are 29 questions, each scored on a 5 point scale with 0 representing \"Unable to do\" and 4 representing \"No difficulty\". The lower the score, the greater the functional disability. 116/116 represents no disability. Minimal detectable change is 12.3 points. Activities of Daily Living:  Score 84 83-68 67-51 50-34 33-18 17-1 0   Modifier CH CI CJ CK CL CM CN     Activities of Daily Living + Sports Subscale:  Score 116 115-94 93-71 70-47 46-24 23-1 0   Modifier CH CI CJ CK CL CM CN     ? Mobility - Walking and Moving Around:     - CURRENT STATUS: CI - 1%-19% impaired, limited or restricted    - GOAL STATUS: CI - 1%-19% impaired, limited or restricted    - D/C STATUS:  ---------------To be determined---------------  ? Changing and Maintaining Body Position:    [id number] - CURRENT STATUS: CJ - 20%-39% impaired, limited or restricted    - GOAL STATUS: CI - 1%-19% impaired, limited or restricted    - D/C STATUS:  CJ - 20%-39% impaired, limited or restricted    Medical Necessity:   · Patient is expected to demonstrate progress in strength, range of motion and symptom levels to return to full function. Reason for Services/Other Comments:  · Patient continues to require skilled intervention due to ongoing symptoms. TREATMENT:   (In addition to Assessment/Re-Assessment sessions the following treatments were rendered)    Subjective Pre-Treatment Symptoms: Patient reports that she is doing pretty good. States she was sick with the flu and couldn't do much but feels better now. Therapeutic Exercise: (15min) Done in order to improve strength, ROM and understanding of current condition. Date  1-31-18 Date  2-21-18   Activity/Exercise     Education · Discussed HEP · Discussed HEP   Inversion  Manual resistance, 5 sec hold,  10x, 2 sets   Heel Raises 10x, 2 sets Double leg, weight shift 75% towards effected extremity, 10x, 3 sets   Single Leg Balance HEP discussed   Foot intrinsic strengthening  Yellow-band, 10x, 3 sets Red band, 10x, 3 sets   Foot intrinsic stretch In sitting, 60 sec 30 sec, 2x     Manual Therapy: (25 min) Done in order to improve joint and soft tissue mobility,reduce muscle guarding, and decrease muscle tone  · Soft tissue mobilization of the plantar fascia, flexor digitorum brevis, abductor digiti minimi  and flexor hallucis brevis. · Talocrural PF mobilization, grade III-IV      Modalities: (0 min) Done in order to reduce swelling and pain  ·     Treatment/Session Assessment: Mrs. Wolfe tolerated the session relatively well. She had decreased trigger points in the foot and displayed improved strength. Discussed her updated HEP.     · Pain: Initial:   0/10 in the foot at rest Post Session:  0/10 at rest   Total Treatment Duration:40 min,10 min re-sergio  PT Patient Time In/Time Out  Time In: 9390  Time Out: 88 [facility] PT, DPT

## 2018-03-14 ENCOUNTER — HOSPITAL ENCOUNTER (OUTPATIENT)
Dept: MAMMOGRAPHY | Age: 83
Discharge: HOME | End: 2018-03-14
Attending: FAMILY MEDICINE
Payer: MEDICARE

## 2018-03-14 DIAGNOSIS — Z12.31 VISIT FOR SCREENING MAMMOGRAM: ICD-10-CM

## 2018-03-14 PROCEDURE — 77067 SCR MAMMO BI INCL CAD: CPT

## (undated) DEVICE — STERILE HOOK LOCK LATEX FREE ELASTIC BANDAGE 6INX5YD: Brand: HOOK LOCK™

## (undated) DEVICE — DRAPE C ARM W54XL84IN MINI FOR OEC 6800

## (undated) DEVICE — SUT ETHLN 3-0 18IN PS1 BLK --

## (undated) DEVICE — AMD ANTIMICROBIAL GAUZE SPONGES,12 PLY USP TYPE VII, 0.2% POLYHEXAMETHYLENE BIGUANIDE HCI (PHMB): Brand: CURITY

## (undated) DEVICE — AMD ANTIMICROBIAL BANDAGE ROLL,6 PLY: Brand: KERLIX

## (undated) DEVICE — SOLUTION IV 1000ML 0.9% SOD CHL

## (undated) DEVICE — 2000CC GUARDIAN II: Brand: GUARDIAN

## (undated) DEVICE — SPLINT CAST W4XL30IN WHT THMB FBRGLS PRECUT INTLOK WRINKLE

## (undated) DEVICE — REM POLYHESIVE ADULT PATIENT RETURN ELECTRODE: Brand: VALLEYLAB

## (undated) DEVICE — KIT INSTR W/ 2.4MM GUIDEPIN SUT PASS WIRE NO2 FIBERWIRE

## (undated) DEVICE — HEADED K-WIRE W/ LONG SMOOTH TIP: Brand: DARCO

## (undated) DEVICE — (D)PREP SKN CHLRAPRP APPL 26ML -- CONVERT TO ITEM 371833

## (undated) DEVICE — STERILE HOOK LOCK LATEX FREE ELASTIC BANDAGE 4INX5YD: Brand: HOOK LOCK™

## (undated) DEVICE — PADDING CAST W4INXL4YD ST COT COHESIVE HND TEARABLE SPEC

## (undated) DEVICE — SUTURE VCRL SZ 2-0 L27IN ABSRB UD L26MM CT-2 1/2 CIR J269H

## (undated) DEVICE — DRSG GZ OIL EMUL CURAD 3X8 --

## (undated) DEVICE — DISPOSABLE TOURNIQUET CUFF SINGLE BLADDER, DUAL PORT AND QUICK CONNECT CONNECTOR: Brand: COLOR CUFF

## (undated) DEVICE — PRECISION THIN (9.0 X 0.38 X 31.0MM)

## (undated) DEVICE — Device

## (undated) DEVICE — BUTTON SWITCH PENCIL BLADE ELECTRODE, HOLSTER: Brand: EDGE